# Patient Record
Sex: MALE | Race: WHITE | NOT HISPANIC OR LATINO | Employment: OTHER | URBAN - METROPOLITAN AREA
[De-identification: names, ages, dates, MRNs, and addresses within clinical notes are randomized per-mention and may not be internally consistent; named-entity substitution may affect disease eponyms.]

---

## 2017-01-04 ENCOUNTER — HISTORICAL (OUTPATIENT)
Dept: LAB | Facility: HOSPITAL | Age: 67
End: 2017-01-04

## 2017-01-11 ENCOUNTER — HISTORICAL (OUTPATIENT)
Dept: CARDIOLOGY | Facility: HOSPITAL | Age: 67
End: 2017-01-11

## 2021-05-03 ENCOUNTER — HISTORICAL (OUTPATIENT)
Dept: LAB | Facility: HOSPITAL | Age: 71
End: 2021-05-03

## 2021-05-03 LAB
ABS NEUT (OLG): 5.6 X10(3)/MCL (ref 1.5–6.9)
BASOPHILS # BLD AUTO: 0 X10(3)/MCL (ref 0–0.1)
BASOPHILS NFR BLD AUTO: 0 % (ref 0–1)
EOSINOPHIL # BLD AUTO: 0.2 X10(3)/MCL (ref 0–0.6)
EOSINOPHIL NFR BLD AUTO: 3 % (ref 0–5)
ERYTHROCYTE [DISTWIDTH] IN BLOOD BY AUTOMATED COUNT: 15.8 % (ref 11.5–17)
FERRITIN SERPL-MCNC: 296.48 NG/ML (ref 21.81–274.66)
FOLATE SERPL-MCNC: 9.9 NG/ML (ref 7–31.4)
HCT VFR BLD AUTO: 32.6 % (ref 42–52)
HGB BLD-MCNC: 10.8 GM/DL (ref 14–18)
IMM GRANULOCYTES # BLD AUTO: 0.03 10*3/UL (ref 0–0.02)
IMM GRANULOCYTES NFR BLD AUTO: 0.4 % (ref 0–0.43)
IRON SATN MFR SERPL: 22 % (ref 20–50)
IRON SERPL-MCNC: 59 UG/DL (ref 65–175)
LYMPHOCYTES # BLD AUTO: 1.5 X10(3)/MCL (ref 0.5–4.1)
LYMPHOCYTES NFR BLD AUTO: 18 % (ref 15–40)
MCH RBC QN AUTO: 31 PG (ref 27–34)
MCHC RBC AUTO-ENTMCNC: 33 GM/DL (ref 31–36)
MCV RBC AUTO: 95 FL (ref 80–99)
MONOCYTES # BLD AUTO: 0.7 X10(3)/MCL (ref 0–1.1)
MONOCYTES NFR BLD AUTO: 9 % (ref 4–12)
NEUTROPHILS # BLD AUTO: 5.6 X10(3)/MCL (ref 1.5–6.9)
NEUTROPHILS NFR BLD AUTO: 69 % (ref 43–75)
PLATELET # BLD AUTO: 176 X10(3)/MCL (ref 140–400)
PMV BLD AUTO: 11.2 FL (ref 6.8–10)
RBC # BLD AUTO: 3.44 X10(6)/MCL (ref 4.7–6.1)
RET# (OHS): 0.06 X10(6)/MCL (ref 0.03–0.1)
RETICULOCYTE COUNT AUTOMATED (OLG): 1.81 % (ref 0.5–1.5)
TIBC SERPL-MCNC: 212 UG/DL (ref 69–240)
TIBC SERPL-MCNC: 271 UG/DL (ref 250–450)
VIT B12 SERPL-MCNC: 454 PG/ML (ref 213–816)
WBC # SPEC AUTO: 8 X10(3)/MCL (ref 4.5–11.5)

## 2021-06-21 ENCOUNTER — HISTORICAL (OUTPATIENT)
Dept: LAB | Facility: HOSPITAL | Age: 71
End: 2021-06-21

## 2021-06-21 LAB
BUN SERPL-MCNC: 22 MG/DL (ref 8.4–25.7)
CALCIUM SERPL-MCNC: 10.2 MG/DL (ref 8.8–10)
CHLORIDE SERPL-SCNC: 103 MMOL/L (ref 98–107)
CO2 SERPL-SCNC: 27 MMOL/L (ref 23–31)
CREAT SERPL-MCNC: 1.38 MG/DL (ref 0.73–1.18)
CREAT/UREA NIT SERPL: 16
GLUCOSE SERPL-MCNC: 231 MG/DL (ref 82–115)
MAGNESIUM SERPL-MCNC: 1.4 MG/DL (ref 1.6–2.6)
POTASSIUM SERPL-SCNC: 4 MMOL/L (ref 3.5–5.1)
SODIUM SERPL-SCNC: 137 MMOL/L (ref 136–145)

## 2022-02-11 ENCOUNTER — HISTORICAL (OUTPATIENT)
Dept: LAB | Facility: HOSPITAL | Age: 72
End: 2022-02-11

## 2022-02-11 LAB
ABS NEUT (OLG): 7 (ref 1.5–6.9)
BUN SERPL-MCNC: 16 MG/DL (ref 8.4–25.7)
CALCIUM SERPL-MCNC: 9.6 MG/DL (ref 8.7–10.5)
CHLORIDE SERPL-SCNC: 107 MMOL/L (ref 98–107)
CO2 SERPL-SCNC: 24 MMOL/L (ref 23–31)
CREAT SERPL-MCNC: 1.28 MG/DL (ref 0.73–1.18)
CREAT/UREA NIT SERPL: 12
ERYTHROCYTE [DISTWIDTH] IN BLOOD BY AUTOMATED COUNT: 14.6 % (ref 11.5–17)
GLUCOSE SERPL-MCNC: 113 MG/DL (ref 82–115)
HCT VFR BLD AUTO: 34.6 % (ref 42–52)
HEMOLYSIS INTERF INDEX SERPL-ACNC: 1
HGB BLD-MCNC: 11.1 G/DL (ref 14–18)
ICTERIC INTERF INDEX SERPL-ACNC: 1
INR PPP: 1.1 (ref 2–3)
LIPEMIC INTERF INDEX SERPL-ACNC: <0
MCH RBC QN AUTO: 30 PG (ref 27–34)
MCHC RBC AUTO-ENTMCNC: 32 G/DL (ref 31–36)
MCV RBC AUTO: 92 FL (ref 80–99)
PLATELET # BLD AUTO: 284 10*3/UL (ref 140–400)
PMV BLD AUTO: 10.8 FL (ref 6.8–10)
POTASSIUM SERPL-SCNC: 5.2 MMOL/L (ref 3.5–5.1)
PROTHROMBIN TIME: 14.2 S (ref 11.7–14.5)
RBC # BLD AUTO: 3.75 10*6/UL (ref 4.7–6.1)
SODIUM SERPL-SCNC: 140 MMOL/L (ref 136–145)
WBC # SPEC AUTO: 9.4 10*3/UL (ref 4.5–11.5)

## 2022-02-24 ENCOUNTER — HISTORICAL (OUTPATIENT)
Dept: CARDIOLOGY | Facility: HOSPITAL | Age: 72
End: 2022-02-24

## 2022-02-24 LAB — CBG: 147 (ref 70–115)

## 2023-06-11 ENCOUNTER — HOSPITAL ENCOUNTER (EMERGENCY)
Facility: HOSPITAL | Age: 73
Discharge: HOME | End: 2023-06-11
Attending: INTERNAL MEDICINE
Payer: MEDICARE

## 2023-06-11 VITALS
HEART RATE: 54 BPM | RESPIRATION RATE: 18 BRPM | HEIGHT: 69 IN | DIASTOLIC BLOOD PRESSURE: 78 MMHG | SYSTOLIC BLOOD PRESSURE: 152 MMHG | BODY MASS INDEX: 31.1 KG/M2 | WEIGHT: 210 LBS | OXYGEN SATURATION: 84 % | TEMPERATURE: 98 F

## 2023-06-11 DIAGNOSIS — I87.2 VENOUS STASIS DERMATITIS OF BOTH LOWER EXTREMITIES: Primary | ICD-10-CM

## 2023-06-11 DIAGNOSIS — L03.119 CELLULITIS OF LOWER EXTREMITY, UNSPECIFIED LATERALITY: ICD-10-CM

## 2023-06-11 PROBLEM — I48.91 ATRIAL FIBRILLATION: Status: ACTIVE | Noted: 2023-06-11

## 2023-06-11 PROBLEM — E11.9 DIABETES MELLITUS: Status: ACTIVE | Noted: 2023-06-11

## 2023-06-11 PROBLEM — I10 HYPERTENSION: Status: ACTIVE | Noted: 2023-06-11

## 2023-06-11 PROCEDURE — 99283 EMERGENCY DEPT VISIT LOW MDM: CPT

## 2023-06-11 RX ORDER — FUROSEMIDE 40 MG/1
40 TABLET ORAL 2 TIMES DAILY
COMMUNITY
Start: 2023-05-25

## 2023-06-11 RX ORDER — VALSARTAN 40 MG/1
40 TABLET ORAL
COMMUNITY
Start: 2023-05-25

## 2023-06-11 RX ORDER — ATORVASTATIN CALCIUM 80 MG/1
80 TABLET, FILM COATED ORAL
COMMUNITY
Start: 2023-04-24

## 2023-06-11 RX ORDER — FELODIPINE 5 MG/1
5 TABLET, EXTENDED RELEASE ORAL
COMMUNITY
Start: 2023-05-25

## 2023-06-11 RX ORDER — ALLOPURINOL 300 MG/1
300 TABLET ORAL
COMMUNITY
Start: 2023-05-25

## 2023-06-11 RX ORDER — SERTRALINE HYDROCHLORIDE 100 MG/1
100 TABLET, FILM COATED ORAL
COMMUNITY
Start: 2023-05-25

## 2023-06-11 RX ORDER — GLIMEPIRIDE 4 MG/1
4 TABLET ORAL
COMMUNITY
Start: 2023-04-24 | End: 2024-01-04

## 2023-06-11 RX ORDER — CARVEDILOL 25 MG/1
25 TABLET ORAL 2 TIMES DAILY
COMMUNITY
Start: 2023-04-24

## 2023-06-11 RX ORDER — AMOXICILLIN AND CLAVULANATE POTASSIUM 875; 125 MG/1; MG/1
1 TABLET, FILM COATED ORAL 2 TIMES DAILY
Qty: 20 TABLET | Refills: 0 | Status: SHIPPED | OUTPATIENT
Start: 2023-06-11 | End: 2023-06-21

## 2023-06-11 NOTE — DISCHARGE INSTRUCTIONS
See wound Care for Evaluation and further management of your wound as needed.  Call them to get an appointment.    [facility]  [address] [place]  [address]            Take medicines as prescribed    See your family doctor in one to 2 days for further evaluation, workup, and treatment as necessary    Avoid driving or operating machinery while taking medicines as some medicines might cause drowsiness and may cause problems. Also pain medicines have potential of being addictive  so use Pain meds specially Narcotics Sparingly.    The exam and treatment you received in Emergency Room was for an urgent problem and NOT INTENDED AS COMPLETE CARE. It is important that you FOLLOW UP with a doctor for ongoing care. If your symptoms become WORSE or you DO NOT IMPROVE and you are unable to reach your health care provider, you should RETURN to the emergency department. The Emergency Room doctor has provided a PRELIMINARY INTERPRETATION of all your STUDIES. A final interpretation may be done after you are discharged. IF A CHANGE in your diagnosis or treatment is needed WE WILL CONTACT YOU. It is critical that we have a CURRENT PHONE NUMBER FOR YOU.

## 2023-06-11 NOTE — ED PROVIDER NOTES
Encounter Date: 6/11/2023  History from patient     History     Chief Complaint   Patient presents with    Leg Swelling     Pt states bilateral legs red and has bumps, pt states he always has leg swelling.      HPI    72 y.o. male  has a past medical history of CHF (congestive heart failure), Chronic venous stasis dermatitis of both lower extremities, Depression, Diabetes mellitus, Gout, unspecified, Hypertension, and Paroxysmal atrial fibrillation. Presenting with  Leg Swelling (Pt states bilateral legs red and has bumps, pt states he always has leg swelling. )        Review of patient's allergies indicates:  No Known Allergies  Past Medical History:   Diagnosis Date    CHF (congestive heart failure)     Chronic venous stasis dermatitis of both lower extremities     Depression     Diabetes mellitus     Gout, unspecified     Hypertension     Paroxysmal atrial fibrillation      Past Surgical History:   Procedure Laterality Date    CORONARY ARTERY BYPASS GRAFT       Family History   Problem Relation Age of Onset    Diabetes Mother     Heart disease Father      Social History     Tobacco Use    Smoking status: Never    Smokeless tobacco: Never   Substance Use Topics    Alcohol use: Never    Drug use: Never     Review of Systems   HENT:  Negative for trouble swallowing and voice change.    Eyes:  Negative for visual disturbance.   Respiratory:  Positive for cough. Negative for chest tightness and shortness of breath.    Cardiovascular:  Positive for leg swelling. Negative for chest pain and palpitations.   Gastrointestinal:  Negative for abdominal pain, diarrhea and vomiting.   Genitourinary:  Negative for dysuria and hematuria.   Musculoskeletal:  Negative for gait problem.        Bilateral lower extremity chronic swelling and in the last couple of weeks has developed redness in there.   Skin:  Negative for color change and rash.   Neurological:  Negative for headaches.   Psychiatric/Behavioral:  Negative for  behavioral problems and sleep disturbance.    All other systems reviewed and are negative.    Physical Exam     Initial Vitals [06/11/23 0901]   BP Pulse Resp Temp SpO2   (!) 151/73 (!) 55 18 98.2 °F (36.8 °C) (!) 91 %      MAP       --         Physical Exam    Nursing note and vitals reviewed.  Constitutional: He appears well-developed and well-nourished. No distress.   HENT:   Head: Atraumatic.   Eyes: EOM are normal.   Neck: Neck supple.   Cardiovascular:  Normal rate and normal heart sounds.           Pulmonary/Chest: Breath sounds normal. No respiratory distress. He has no wheezes. He has no rales.   Abdominal: Abdomen is soft. Bowel sounds are normal. There is no abdominal tenderness.   Musculoskeletal:         General: Edema present. No tenderness. Normal range of motion.      Cervical back: Neck supple. No bony tenderness.     Neurological: He is alert.   Speech Normal   Skin: Skin is dry.   Bilateral lower extremity venous stasis dermatitis with some erythema of bilateral lower extremities, it blanches, no particular tenderness, edema is nonpitting   Psychiatric: He has a normal mood and affect.   Pleasant       ED Course   Procedures    Orders Placed This Encounter   Procedures    Ambulatory referral/consult to Wound Clinic         Labs Reviewed - No data to display       Imaging Results    None          Medications - No data to display  Medical Decision Making:   Initial Assessment:   72 y.o. male  has a past medical history of CHF (congestive heart failure), Chronic venous stasis dermatitis of both lower extremities, Depression, Diabetes mellitus, Gout, unspecified, Hypertension, and Paroxysmal atrial fibrillation. Presenting with  Leg Swelling (Pt states bilateral legs red and has bumps, pt states he always has leg swelling. )    Patient essentially has chronic venous stasis dermatitis of bilateral lower extremities, he said a couple of years back he was admitted in the hospital and he was oozing serum  from there and they had to put dressings on both of them which took care of it,    Denies any fever, denies any other complaints whatsoever denies any shortness of breath, he has some cough which she relates to sinus problem.    Patient is taking Xarelto for AFib on a regular basis he gets the samples from his doctor             ED Course as of 06/11/23 0947   Sun Jun 11, 2023   0946 Have sent a consult for wound care for him and I am putting him on Augmentin for cellulitis of bilateral lower extremities which is more superficial erythema at this time and no deep extension at this time. [GQ]      ED Course User Index  [GQ] Gabbi Joy MD                 Clinical Impression:   Final diagnoses:  [I87.2] Venous stasis dermatitis of both lower extremities (Primary)  [L03.119] Cellulitis of lower extremity, unspecified laterality - Bilateral excluding feet        ED Disposition Condition    Discharge Stable          ED Prescriptions       Medication Sig Dispense Start Date End Date Auth. Provider    amoxicillin-clavulanate 875-125mg (AUGMENTIN) 875-125 mg per tablet Take 1 tablet by mouth 2 (two) times daily. for 10 days 20 tablet 6/11/2023 6/21/2023 Gabbi Joy MD          Follow-up Information       Follow up With Specialties Details Why Contact Info    PMD  In 2 days               Gabbi Joy MD  06/11/23 0922

## 2023-06-16 ENCOUNTER — HOSPITAL ENCOUNTER (OUTPATIENT)
Dept: WOUND CARE | Facility: HOSPITAL | Age: 73
Discharge: HOME | End: 2023-06-16
Attending: FAMILY MEDICINE
Payer: MEDICARE

## 2023-06-16 VITALS
WEIGHT: 210 LBS | SYSTOLIC BLOOD PRESSURE: 145 MMHG | BODY MASS INDEX: 31.1 KG/M2 | RESPIRATION RATE: 18 BRPM | DIASTOLIC BLOOD PRESSURE: 68 MMHG | HEIGHT: 69 IN | HEART RATE: 55 BPM

## 2023-06-16 DIAGNOSIS — I87.2 CHRONIC VENOUS INSUFFICIENCY: Primary | ICD-10-CM

## 2023-06-16 DIAGNOSIS — I87.2 STASIS DERMATITIS OF BOTH LEGS: ICD-10-CM

## 2023-06-16 PROCEDURE — 99203 OFFICE O/P NEW LOW 30 MIN: CPT

## 2023-06-16 PROCEDURE — 27000999 HC MEDICAL RECORD PHOTO DOCUMENTATION

## 2023-06-16 RX ORDER — BETAMETHASONE VALERATE 1.2 MG/G
CREAM TOPICAL DAILY
Qty: 45 G | Refills: 1 | Status: SHIPPED | OUTPATIENT
Start: 2023-06-16 | End: 2023-06-16 | Stop reason: SDUPTHER

## 2023-06-16 RX ORDER — BETAMETHASONE VALERATE 1.2 MG/G
CREAM TOPICAL DAILY
Qty: 45 G | Refills: 1 | Status: SHIPPED | OUTPATIENT
Start: 2023-06-16 | End: 2023-08-07

## 2023-06-16 NOTE — PROGRESS NOTES
"   Subjective:       Patient ID: Chepe Peralta Sr. is a 72 y.o. male.    Chief Complaint: No chief complaint on file.    72-year-old white male, who was referred from the emergency room several days ago, for stasis dermatitis and possible early cellulitis both lower legs.  He also has some edema around his ankles lower legs and feet.  He had this a few years ago also.  He has never been diagnosed with venous insufficiency as for a as he knows.  He also has not been diagnosed with arterial peripheral disease.  He does have a history of CHF, with atrial fibrillation.  He is scheduled to see his cardiologist soon.  He tells me that the erythema has improved since he was in the emergency room last week.  He also has some small blisters posteriorly on both calves.  There was no venous reflux noted in either leg on a venous ultrasound in October 2020.  Presented to [facility] ED on 06/11/2023 with c/o leg swelling and redness with blisters appearing. Patient was given PO Augmentin and told to make an appointment with wound care. Is a patient of Dr. Sevilla with [facility] but denies ever having any ultrasounds of his legs and has never has any stents placed in legs. Has had a CABG done in the past; patient in unsure of exact date but states it has been around 10-12 years and is currently taking Xarelto. DMII with a CBG of 131 today. Has full sensation of his feet. [place] Abraham is 10/10.     Review of Systems   Constitutional: Negative.    HENT: Negative.     Respiratory: Negative.     Cardiovascular: Negative.    Skin:         As documented in the HPI.   All other systems reviewed and are negative.      Objective:      Vitals:    06/16/23 1021   BP: (!) 145/68   BP Location: Right arm   Patient Position: Sitting   Pulse: (!) 55   Resp: 18   Weight: 95.3 kg (210 lb)   Height: 5' 9" (1.753 m)        Physical Exam  Vitals and nursing note reviewed.   Constitutional:       Appearance: Normal appearance.   Cardiovascular:      Rate " and Rhythm: Normal rate.   Pulmonary:      Effort: Pulmonary effort is normal.   Skin:     General: Skin is warm and dry.      Comments: Both of his legs reveal edema around the ankles and feet, pitting edema.  There is some chronic erythema on both anterior legs.  There are some blisters behind the calves.  There are no open wounds at this time.  The pulses are not palpable, but I was able to auscultated all 4 pedal pulses, with the Doppler.  It sounds triphasic.   Neurological:      Mental Status: He is alert.     Left lower posterior    Left lower medial leg    Left lower anterior leg    Right lower medial leg    Right lower anterior leg       Altered Skin Integrity 06/16/23 1024 Right lower Leg #1 (Active)   06/16/23 1024   Altered Skin Integrity Present on Admission - Did Patient arrive to the hospital with altered skin?: yes   Side: Right   Orientation: lower   Location: Leg   Wound Number: #1   Is this injury device related?: No   Primary Wound Type:    Description of Altered Skin Integrity:    Ankle-Brachial Index:    Pulses:    Removal Indication and Assessment:    Wound Outcome:    (Retired) Wound Length (cm):    (Retired) Wound Width (cm):    (Retired) Depth (cm):    Wound Description (Comments):    Removal Indications:    Dressing Appearance Moist drainage;Open to air 06/16/23 1023   Drainage Amount None 06/16/23 1023   Appearance Red 06/16/23 1023   Tissue loss description Full thickness 06/16/23 1023   Periwound Area Edematous;Redness;Blistered 06/16/23 1023   Wound Edges Open 06/16/23 1023   Wound Length (cm) 28 cm 06/16/23 1023   Wound Width (cm) 42.5 cm 06/16/23 1023   Wound Depth (cm) 0.2 cm 06/16/23 1023   Wound Volume (cm^3) 238 cm^3 06/16/23 1023   Wound Surface Area (cm^2) 1190 cm^2 06/16/23 1023   Care Cleansed with:;Wound cleanser 06/16/23 1023   Dressing Applied;Other (comment) 06/16/23 1023   Dressing Change Due 06/17/23 06/16/23 1023            Altered Skin Integrity 06/16/23 1024 Left  lower Leg #2 (Active)   06/16/23 1024   Altered Skin Integrity Present on Admission - Did Patient arrive to the hospital with altered skin?: yes   Side: Left   Orientation: lower   Location: Leg   Wound Number: #2   Is this injury device related?: No   Primary Wound Type:    Description of Altered Skin Integrity:    Ankle-Brachial Index:    Pulses:    Removal Indication and Assessment:    Wound Outcome:    (Retired) Wound Length (cm):    (Retired) Wound Width (cm):    (Retired) Depth (cm):    Wound Description (Comments):    Removal Indications:    Dressing Appearance Open to air 06/16/23 1023   Drainage Amount None 06/16/23 1023   Appearance Red 06/16/23 1023   Tissue loss description Full thickness 06/16/23 1023   Periwound Area Edematous;Redness;Blistered 06/16/23 1023   Wound Edges Open 06/16/23 1023   Wound Length (cm) 34 cm 06/16/23 1023   Wound Width (cm) 43 cm 06/16/23 1023   Wound Depth (cm) 0.2 cm 06/16/23 1023   Wound Volume (cm^3) 292.4 cm^3 06/16/23 1023   Wound Surface Area (cm^2) 1462 cm^2 06/16/23 1023   Care Cleansed with:;Wound cleanser 06/16/23 1023   Dressing Applied;Other (comment) 06/16/23 1023   Dressing Change Due 06/17/23 06/16/23 1023         Assessment:         ICD-10-CM ICD-9-CM   1. Chronic venous insufficiency  I87.2 459.81   2. Stasis dermatitis of both legs  I87.2 454.1         Procedures:   Excisional debridement performed:  [] Yes [x] No   Selective debridement performed:  [] Yes [x] No   Mechanical debridement performed:  [x] Yes [] No   Silver nitrate applied :    [] Yes [x] No   Labs ordered this visit:   [] Yes [x] No   Imaging ordered this visit:   [] Yes [x] No   Tissue pathology and/or culture taken:  [] Yes [x] No     Procedures:  HOME HEALTH AGENCY: none  TIMES PER WEEK/DAYS:  ORDERS:  Right lower leg wound: Cleanse with normal saline, apply steroid cream, and size F tubigrip to be done daily   Left lower leg wound: Cleanse with normal saline, apply steroid cream, and size  F tubigrip to be done daily     *Order ultrasounds of bilateral legs   Betamethasone 0.1% cream daily    Patient Orders:  He may need Unna boots in the future, if arterial circulation adequate.               Follow up in 1 week (on 6/23/2023) for praveen. lower legs .

## 2023-06-26 ENCOUNTER — HOSPITAL ENCOUNTER (OUTPATIENT)
Dept: RADIOLOGY | Facility: HOSPITAL | Age: 73
Discharge: HOME | End: 2023-06-26
Attending: FAMILY MEDICINE
Payer: MEDICARE

## 2023-06-26 DIAGNOSIS — R60.0 LOCALIZED EDEMA: ICD-10-CM

## 2023-06-26 PROCEDURE — 93925 LOWER EXTREMITY STUDY: CPT | Mod: TC

## 2023-06-26 PROCEDURE — 93970 EXTREMITY STUDY: CPT | Mod: TC

## 2023-07-19 ENCOUNTER — HOSPITAL ENCOUNTER (EMERGENCY)
Facility: HOSPITAL | Age: 73
Discharge: HOME | End: 2023-07-19
Attending: EMERGENCY MEDICINE
Payer: MEDICARE

## 2023-07-19 VITALS
SYSTOLIC BLOOD PRESSURE: 136 MMHG | RESPIRATION RATE: 18 BRPM | DIASTOLIC BLOOD PRESSURE: 58 MMHG | OXYGEN SATURATION: 95 % | BODY MASS INDEX: 27.2 KG/M2 | WEIGHT: 190 LBS | HEART RATE: 56 BPM | TEMPERATURE: 99 F | HEIGHT: 70 IN

## 2023-07-19 DIAGNOSIS — L03.119 CELLULITIS OF LOWER EXTREMITY, UNSPECIFIED LATERALITY: ICD-10-CM

## 2023-07-19 DIAGNOSIS — I87.2 CHRONIC VENOUS STASIS DERMATITIS OF BOTH LOWER EXTREMITIES: Primary | ICD-10-CM

## 2023-07-19 PROCEDURE — 99283 EMERGENCY DEPT VISIT LOW MDM: CPT

## 2023-07-19 RX ORDER — AMOXICILLIN AND CLAVULANATE POTASSIUM 875; 125 MG/1; MG/1
1 TABLET, FILM COATED ORAL 2 TIMES DAILY
Qty: 14 TABLET | Refills: 0 | Status: SHIPPED | OUTPATIENT
Start: 2023-07-19 | End: 2023-07-26

## 2023-07-19 NOTE — ED PROVIDER NOTES
Encounter Date: 2023       History     Chief Complaint   Patient presents with    Leg Swelling     Bilat leg celulitis for 3 weeks     Mr. Peralta comes emergency department complaining of redness to both his legs this has been there for several months.  This is not new or different.  He has been sent to Wound Care for this chronic venous stasis dermatitis is primary diagnosis in addition to his diabetes hypertension depression gout.  He is anticoagulated for his atrial fibrillation.      Quit smoking over 25 years ago still drinks alcohol occasionally no street drugs    Patient has had no COVID vaccines.  Pneumonia yes flu yes tetanus she has.      Triple bypass surgery years ago.      Primary healthcare provider Payton Lawson [phone] [facility]    Is  retired   mom  of diabetes problems dad  of heart troubles.    Has no known drug allergies.  Denies fever chills denies nausea denies vomiting    Review of patient's allergies indicates:  No Known Allergies  Past Medical History:   Diagnosis Date    CHF (congestive heart failure)     Chronic venous stasis dermatitis of both lower extremities     Depression     Diabetes mellitus     Gout, unspecified     Hypertension     Paroxysmal atrial fibrillation      Past Surgical History:   Procedure Laterality Date    CORONARY ARTERY BYPASS GRAFT       Family History   Problem Relation Age of Onset    Diabetes Mother     Heart disease Father      Social History     Tobacco Use    Smoking status: Never    Smokeless tobacco: Current   Substance Use Topics    Alcohol use: Never    Drug use: Never     Review of Systems   Constitutional: Negative.    HENT: Negative.     Eyes: Negative.    Respiratory: Negative.     Cardiovascular:  Positive for leg swelling.   Gastrointestinal: Negative.    Endocrine: Negative.    Skin:  Positive for rash and wound.        Bilateral lower extremities very symmetrical erythematous changes circumferentially.   From the proximal 1/4 of the tib fib down to the ankle.  Venous stasis dermatitis.  Minimum weeping no obvious open gross sores no odor no drainage   Allergic/Immunologic: Negative.    Neurological: Negative.    Hematological: Negative.    Psychiatric/Behavioral: Negative.     All other systems reviewed and are negative.    Physical Exam     Initial Vitals [07/19/23 1437]   BP Pulse Resp Temp SpO2   136/68 (!) 58 18 99 °F (37.2 °C) 95 %      MAP       --         Physical Exam    Nursing note and vitals reviewed.  Constitutional: He appears well-developed and well-nourished.   White male awake oriented pleasant fellow   HENT:   Head: Normocephalic and atraumatic.   Right Ear: Tympanic membrane and external ear normal.   Left Ear: Tympanic membrane and external ear normal.   Nose: Nose normal.   Mouth/Throat: Oropharynx is clear and moist and mucous membranes are normal. Oral lesions: moist muc memb.   Eyes: Conjunctivae and EOM are normal. Pupils are equal, round, and reactive to light.   Neck: Neck supple. No thyromegaly present. No tracheal deviation present. No JVD present.   Normal range of motion.  Cardiovascular:  Normal rate, regular rhythm, normal heart sounds and intact distal pulses.     Exam reveals no gallop and no friction rub.       No murmur heard.  Pulmonary/Chest: No stridor. He has wheezes. He has no rhonchi. He has rales. He exhibits no tenderness.   Abdominal: Abdomen is soft. Bowel sounds are normal. He exhibits no distension and no mass. No signs of injury. There is no abdominal tenderness.   Genitourinary:    Genitourinary Comments: No CVA tenderness     Musculoskeletal:         General: No tenderness or edema. Normal range of motion.      Cervical back: Normal range of motion and neck supple.     Lymphadenopathy:     He has no cervical adenopathy.   Neurological: He is alert and oriented to person, place, and time. He has normal strength. No cranial nerve deficit or sensory deficit. GCS score  is 15. GCS eye subscore is 4. GCS verbal subscore is 5. GCS motor subscore is 6.   Skin: Skin is warm and dry. Capillary refill takes 2 to 3 seconds. Rash noted.   Bilateral lower extremities fiery red circumferential swelling pitting edema of both lower extremities.  Extends from just below the tibial tuberosity down to the ankle.  Symmetrical on both sides there is minimal amount of blistering there is minimum amount of weeping there is no open sores or foul drainage there is no odor associated with the distal dorsalis pedis posterior tibial pulses present.  Patient has normal sensation.  Patient does have pitting edema in all this area of erythema.    Review of prior records show that on the 26th of June patient had normal arterial ultrasound and normal venous ultrasounds in regard to flow and no DVTs.  Bilateral lower extremity     Psychiatric: He has a normal mood and affect. His behavior is normal. Judgment and thought content normal.       ED Course   Procedures  Labs Reviewed - No data to display       Imaging Results    None          Medications - No data to display  Medical Decision Making:   Initial Assessment:   Chronic leg issues  Bilateral lower extremities fire red erythematous changes circumferentially consistent with chronic venous stasis dermatitis.  Minimum weeping no open sores no foul odor.  Dorsalis pedis posterior pulses are intact 2+ pitting  Edema lower extremities chronic COPD with minimum end-stage wheezing.  Good air movement awake oriented speaking clearly not toxic today.  Remainder of the exam is fairly benign.  Differential Diagnosis:   Chronic venous stasis disease bilateral lower extremities.  Possibly minimum secondary cellulitis.  Noncompliance with medical treatment plan  ED Management:  Treatments evaluation I am willing to try the short course of Augmentin that he says helped him in June 7 days written I stressed to the son and the patient they need to call their primary  healthcare provider make an appointment son was doing that when I left the room  MDM  Problems addressed  Co-morbidities and/or factors adding to the complexity or risk for the patient:  Noncompliance with appointments has not seen primary healthcare provider since last October  Problems addressed:  Exacerbation of chronic venous stasis dermatitis bilateral lower extremities  Acute problem/illness or progression/exacerbation of chronic problem with potential threat to life/bodily dysfunction?:  The cellulitis could become full blown  Differential diagnoses/problems considered: see above     Amount and/or Complexity of Data Reviewed  Independent Historian: none (see above for summary)  External Data Reviewed: notes from previous ED visits, prior labs, and prior imaging (see above for summary)  Risk and benefits of testing: discussed   Labs: Labs: ordered and reviewed  Radiology:Radiology: ordered and independent interpretation performed (see above or ED course)  ECG/medicine tests:Radiology: ordered and independent interpretation performed (see above or ED course)  discussed with primary care physician    Risk  Prescription drug management shared decision-making    Critical Care  none            ED Course as of 07/19/23 1537   Wed Jul 19, 2023   1511 DARLING LAWSON  [facility]     I spoke to Ms. Lawson  She said that she has not seen the patient since last October that the patient does not keep his appointments on a regular basis.  She recommended that they schedule an appointment come in to get seen she complete the paperwork for rehabilitation admission if indicated [DM]   1536 I did review the old records.  In June he was treated with a course of Augmentin which she said greatly improved his legs.  I am willing to put him back on a short course of this 7 days I did explain to Mr. Garcia in his son who was in the room that I talked to the primary healthcare provider is not been in since October he  must call and make an appointment and go get seen and re-evaluated.  In the office [DM]      ED Course User Index  [DM] Nael Yañez MD                   Clinical Impression:   Final diagnoses:  [I87.2] Chronic venous stasis dermatitis of both lower extremities (Primary)  [L03.119] Cellulitis of lower extremity, unspecified laterality - Bilateral chronic        ED Disposition Condition    Discharge Stable          ED Prescriptions       Medication Sig Dispense Start Date End Date Auth. Provider    amoxicillin-clavulanate 875-125mg (AUGMENTIN) 875-125 mg per tablet Take 1 tablet by mouth 2 (two) times daily. for 7 days 14 tablet 7/19/2023 7/26/2023 Nael Yañez MD          Follow-up Information       Follow up With Specialties Details Why Contact Info    HOME [facility] Call today make an appointment for tomorrow or Friday to get re-evaluated and be assessed for admission to rehabilitation unit [address]  [facility] [address]  [phone]               Nael Yañez MD  07/19/23 5912

## 2023-07-19 NOTE — DISCHARGE INSTRUCTIONS
Please take the 7 days of antibiotics to help make sure this is no secondary infection     elevate your legs as much as possible     continue previous medication otherwise      you must see your primary healthcare provider regarding admission to the rehabilitation facility

## 2023-07-24 ENCOUNTER — HOSPITAL ENCOUNTER (OUTPATIENT)
Dept: RADIOLOGY | Facility: HOSPITAL | Age: 73
Discharge: HOME | End: 2023-07-24
Attending: NURSE PRACTITIONER
Payer: MEDICARE

## 2023-07-24 DIAGNOSIS — M25.561 RIGHT KNEE PAIN: ICD-10-CM

## 2023-07-24 PROCEDURE — 73562 X-RAY EXAM OF KNEE 3: CPT | Mod: TC,RT

## 2023-08-07 ENCOUNTER — HOSPITAL ENCOUNTER (OUTPATIENT)
Dept: WOUND CARE | Facility: HOSPITAL | Age: 73
Discharge: HOME | End: 2023-08-07
Attending: FAMILY MEDICINE
Payer: MEDICARE

## 2023-08-07 VITALS
BODY MASS INDEX: 27.2 KG/M2 | SYSTOLIC BLOOD PRESSURE: 136 MMHG | RESPIRATION RATE: 18 BRPM | HEART RATE: 55 BPM | DIASTOLIC BLOOD PRESSURE: 62 MMHG | HEIGHT: 70 IN | WEIGHT: 190 LBS

## 2023-08-07 DIAGNOSIS — I87.2 VENOUS STASIS DERMATITIS OF BOTH LOWER EXTREMITIES: ICD-10-CM

## 2023-08-07 DIAGNOSIS — L03.119 CELLULITIS OF LOWER EXTREMITY, UNSPECIFIED LATERALITY: ICD-10-CM

## 2023-08-07 DIAGNOSIS — B07.9 VERRUCA VULGARIS OF LOWER EXTREMITY: Primary | ICD-10-CM

## 2023-08-07 PROCEDURE — 27000999 HC MEDICAL RECORD PHOTO DOCUMENTATION

## 2023-08-07 PROCEDURE — 99213 OFFICE O/P EST LOW 20 MIN: CPT

## 2023-08-07 PROCEDURE — 29580 STRAPPING UNNA BOOT: CPT | Mod: 50

## 2023-08-07 RX ORDER — TAMSULOSIN HYDROCHLORIDE 0.4 MG/1
1 CAPSULE ORAL
COMMUNITY
Start: 2023-07-14

## 2023-08-07 NOTE — PROGRESS NOTES
Home Health: NA  Smoker   [] Yes   [x] No   Diabetic  [x] Yes  [] No   Vascular Surgeon: NS (Dr. Sevilla -- Cardiologist)  Vascular procedures [] Yes [x] No    Recent Ultrasounds [x] Yes [] No   If so, when were they done? 06/26/2023  Compression Pumps  [] Yes [x] No       Right ankle - 27.5     Right calf -41.5     Left ankle -  28     Left calf -43  Doppler done in office? [] Yes [x] No        Right dorsalis:     Right posterior:     Left dorsalis:     Left posterior:  Is the patient eligible for a graft, and/or currently grafting? [] Yes [x] No    If so, which one/size?          Subjective:       Patient ID: Chepe Peralta Sr. is a 72 y.o. male.    Chief Complaint: Wound Care (Right lower leg /Left lower leg )    HPI    6/16/23 (Dr. Ann) - 72-year-old white male, who was referred from the emergency room several days ago, for stasis dermatitis and possible early cellulitis both lower legs.  He also has some edema around his ankles lower legs and feet.  He had this a few years ago also.  He has never been diagnosed with venous insufficiency as for a as he knows.  He also has not been diagnosed with arterial peripheral disease.  He does have a history of CHF, with atrial fibrillation.  He is scheduled to see his cardiologist soon.  He tells me that the erythema has improved since he was in the emergency room last week.  He also has some small blisters posteriorly on both calves.  There was no venous reflux noted in either leg on a venous ultrasound in October 2020.  Presented to [facility] ED on 06/11/2023 with c/o leg swelling and redness with blisters appearing. Patient was given PO Augmentin and told to make an appointment with wound care. Is a patient of Dr. Sevilla with [facility] but denies ever having any ultrasounds of his legs and has never has any stents placed in legs. Has had a CABG done in the past; patient in unsure of exact date but states it has been around 10-12 years and is currently taking Xarelto. DMII with a  "CBG of 131 today. Has full sensation of his feet. [place] is 10/10.     8/7/23 - Mr. Peralta returns to clinic today for follow up on his bilateral lower extremities.  He was seen at wound care on 06/16/2023 by Dr. Ann and was given steroid cream and had ultrasounds ordered. Ultrasounds completed on 06/26/2023. Patient has been using hibiclens at home and the bilateral legs have improved.  There is still hemosiderin staining present, as well as lipodermatosclerosis.  Today the majority of the blistered areas are resolved.  We did review his ultrasound results and because there are no significant arterial concerns we will move forward with application of unna boots. There is very little drainage noted, and because of that we will leave the unna boots in place for one week without change.       Review of Systems   Constitutional: Negative.    HENT: Negative.     Respiratory: Negative.     Cardiovascular: Negative.    Skin:         As documented in the HPI.   All other systems reviewed and are negative.        Objective:      Vitals:    08/07/23 1106   BP: 136/62   BP Location: Right arm   Patient Position: Sitting   Pulse: (!) 55   Resp: 18   Weight: 86.2 kg (190 lb)   Height: 5' 10" (1.778 m)        Physical Exam  Vitals and nursing note reviewed.   Constitutional:       Appearance: Normal appearance.   Cardiovascular:      Rate and Rhythm: Normal rate.   Pulmonary:      Effort: Pulmonary effort is normal.   Skin:     General: Skin is warm and dry.      Comments: Blisters to bilateral calves.   Neurological:      Mental Status: He is alert.            Altered Skin Integrity 06/16/23 1024 Right lower Leg #1 (Active)   06/16/23 1024   Altered Skin Integrity Present on Admission - Did Patient arrive to the hospital with altered skin?: yes   Side: Right   Orientation: lower   Location: Leg   Wound Number: #1   Is this injury device related?: No   Primary Wound Type:    Description of Altered Skin Integrity:  "   Ankle-Brachial Index:    Pulses:    Removal Indication and Assessment:    Wound Outcome:    (Retired) Wound Length (cm):    (Retired) Wound Width (cm):    (Retired) Depth (cm):    Wound Description (Comments):    Removal Indications:    Dressing Appearance Moist drainage 08/07/23 1107   Drainage Amount Moderate 08/07/23 1107   Drainage Characteristics/Odor Serosanguineous 08/07/23 1107   Appearance Moist 08/07/23 1107   Tissue loss description Full thickness 08/07/23 1107   Periwound Area Edematous;Redness 08/07/23 1107   Wound Edges Open 08/07/23 1107   Wound Length (cm) 26 cm 08/07/23 1107   Wound Width (cm) 41.5 cm 08/07/23 1107   Wound Depth (cm) 0.1 cm 08/07/23 1107   Wound Volume (cm^3) 107.9 cm^3 08/07/23 1107   Wound Surface Area (cm^2) 1079 cm^2 08/07/23 1107   Care Cleansed with:;Wound cleanser 08/07/23 1107   Dressing Applied;Other (comment) 08/07/23 1107   Dressing Change Due 08/14/23 08/07/23 1107            Altered Skin Integrity 06/16/23 1024 Left lower Leg #2 (Active)   06/16/23 1024   Altered Skin Integrity Present on Admission - Did Patient arrive to the hospital with altered skin?: yes   Side: Left   Orientation: lower   Location: Leg   Wound Number: #2   Is this injury device related?: No   Primary Wound Type:    Description of Altered Skin Integrity:    Ankle-Brachial Index:    Pulses:    Removal Indication and Assessment:    Wound Outcome:    (Retired) Wound Length (cm):    (Retired) Wound Width (cm):    (Retired) Depth (cm):    Wound Description (Comments):    Removal Indications:    Dressing Appearance Moist drainage 08/07/23 1107   Drainage Amount Moderate 08/07/23 1107   Drainage Characteristics/Odor Serosanguineous 08/07/23 1107   Appearance Moist 08/07/23 1107   Tissue loss description Full thickness 08/07/23 1107   Periwound Area Edematous;Redness 08/07/23 1107   Wound Edges Open 08/07/23 1107   Wound Length (cm) 25 cm 08/07/23 1107   Wound Width (cm) 43 cm 08/07/23 1107   Wound Depth  (cm) 0.2 cm 08/07/23 1107   Wound Volume (cm^3) 215 cm^3 08/07/23 1107   Wound Surface Area (cm^2) 1075 cm^2 08/07/23 1107   Care Wound cleanser;Cleansed with: 08/07/23 1107   Dressing Applied;Other (comment) 08/07/23 1107   Dressing Change Due 08/14/23 08/07/23 1107     8/7/23    Left lower leg   Calamine unna boot, kerlix, coban   CT      Right lower leg   Calamine unna boot, kerlix, coban  CT      Assessment:         ICD-10-CM ICD-9-CM   1. Venous stasis dermatitis of both lower extremities  I87.2 454.1   2. Cellulitis of lower extremity, unspecified laterality  L03.119 682.6         Procedures:     Mechanical debridement only     Excisional debridement performed:  [] Yes [] No   Selective debridement performed:  [] Yes [] No   Mechanical debridement performed:  [x] Yes [] No   Silver nitrate applied :    [] Yes [] No   Labs ordered this visit:   [] Yes [] No   Imaging ordered this visit:   [] Yes [] No   Tissue pathology and/or culture taken:  [] Yes [] No     Imaging:  Arterial:  FINDINGS:  Ultrasound Doppler and color flow imaging were performed on the arteries of the lower extremities bilaterally. There is scattered plaque formation at the arteries of lower extremities bilaterally with less than 30% stenosis at the common femoral arteries bilaterally.  A tri phasic flow wave pattern is evident throughout the visualized arteries of the lower extremities bilaterally.   Impression:   1. Scattered atherosclerotic plaque formation throughout the arteries of the lower extremities bilaterally with less than 30% stenosis at the common femoral arteries bilaterally.    Venous:  FINDINGS:  There is normal compression and flow noted in the  common femoral vein, superficial femoral vein, popliteal vein, and  posterior tibial veinbilaterally.  No evidence of deep venous thrombosis is identified.  The left greater saphenous vein is surgically absent.  No venous reflux is identified.  Impression:   1.  No deep venous  thrombosis identified to the lower extremities bilaterally   2.  Surgically absent left greater saphenous vein          Procedures:  HOME HEALTH AGENCY: none  TIMES PER WEEK/DAYS:  ORDERS:  Right lower leg wounds: Leave current dressing, calamine unna boot, in place for one week. Do not remove until next wound care visit on 08/14/2023  Left lower leg wounds: Leave current dressing, calamine unna boot, in place for one week. Do not remove until next wound care visit on 08/14/2023               Follow up in 1 week (on 8/14/2023) for bilateral lower legs.        Electronically signed,    Marta Heard

## 2023-08-14 ENCOUNTER — HOSPITAL ENCOUNTER (OUTPATIENT)
Dept: WOUND CARE | Facility: HOSPITAL | Age: 73
Discharge: HOME | End: 2023-08-14
Attending: NURSE PRACTITIONER
Payer: MEDICARE

## 2023-08-14 VITALS
DIASTOLIC BLOOD PRESSURE: 63 MMHG | RESPIRATION RATE: 17 BRPM | BODY MASS INDEX: 27.2 KG/M2 | HEIGHT: 70 IN | SYSTOLIC BLOOD PRESSURE: 139 MMHG | WEIGHT: 190 LBS | HEART RATE: 57 BPM

## 2023-08-14 DIAGNOSIS — B07.9 VERRUCA VULGARIS OF LOWER EXTREMITY: ICD-10-CM

## 2023-08-14 DIAGNOSIS — I87.2 CHRONIC VENOUS INSUFFICIENCY: ICD-10-CM

## 2023-08-14 DIAGNOSIS — I87.2 VENOUS STASIS DERMATITIS OF BOTH LOWER EXTREMITIES: Primary | ICD-10-CM

## 2023-08-14 DIAGNOSIS — I87.2 STASIS DERMATITIS OF BOTH LEGS: ICD-10-CM

## 2023-08-14 PROCEDURE — 27000999 HC MEDICAL RECORD PHOTO DOCUMENTATION

## 2023-08-14 PROCEDURE — 99212 OFFICE O/P EST SF 10 MIN: CPT | Mod: 25

## 2023-08-14 PROCEDURE — 29580 STRAPPING UNNA BOOT: CPT

## 2023-08-14 NOTE — PROGRESS NOTES
Home Health: NA  Smoker   [] Yes   [x] No   Diabetic  [x] Yes  [] No   Vascular Surgeon: NS (Dr. Sevilla -- Cardiologist)  Vascular procedures [] Yes [x] No    Recent Ultrasounds [x] Yes [] No   If so, when were they done? 06/26/2023  Compression Pumps  [] Yes [x] No      8/14/23:  Right ankle - 29.0  Right calf - 42.5  Left ankle - 30.0  Left calf - 45.5    8/7/23:  Right ankle - 27.5  Right calf -41.5  Left ankle -  28  Left calf -43    Doppler done in office? [] Yes [x] No        Right dorsalis:     Right posterior:     Left dorsalis:     Left posterior:  Is the patient eligible for a graft, and/or currently grafting? [] Yes [x] No    If so, which one/size?          Subjective:       Patient ID: Chepe Peralta Sr. is a 72 y.o. male.    Chief Complaint: Venous Ulcer (Right lower leg /Left lower leg /)    HPI    6/16/23 (Dr. Ann) - 72-year-old white male, who was referred from the emergency room several days ago, for stasis dermatitis and possible early cellulitis both lower legs.  He also has some edema around his ankles lower legs and feet.  He had this a few years ago also.  He has never been diagnosed with venous insufficiency as for a as he knows.  He also has not been diagnosed with arterial peripheral disease.  He does have a history of CHF, with atrial fibrillation.  He is scheduled to see his cardiologist soon.  He tells me that the erythema has improved since he was in the emergency room last week.  He also has some small blisters posteriorly on both calves.  There was no venous reflux noted in either leg on a venous ultrasound in October 2020.  Presented to [facility] ED on 06/11/2023 with c/o leg swelling and redness with blisters appearing. Patient was given PO Augmentin and told to make an appointment with wound care. Is a patient of Dr. Sevilla with [facility] but denies ever having any ultrasounds of his legs and has never has any stents placed in legs. Has had a CABG done in the past; patient in unsure of  "exact date but states it has been around 10-12 years and is currently taking Xarelto. DMII with a CBG of 131 today. Has full sensation of his feet. [place] Rosario is 10/10.     8/7/23 - Mr. Peralta returns to clinic today for follow up on his bilateral lower extremities.  He was seen at wound care on 06/16/2023 by Dr. Ann and was given steroid cream and had ultrasounds ordered. Ultrasounds completed on 06/26/2023. Patient has been using hibiclens at home and the bilateral legs have improved.  There is still hemosiderin staining present, as well as lipodermatosclerosis.  Today the majority of the blistered areas are resolved.  We did review his ultrasound results and because there are no significant arterial concerns we will move forward with application of unna boots. There is very little drainage noted, and because of that we will leave the unna boots in place for one week without change.     8/14/23 - The patient returns today for his bilateral lower extremities.  He did have a small open area on the left lower extremity which has, for the most part, resolved.  There is still some very slight amount of drainage, however, the Unna boots do appear to be working.  We are measuring for compression leaves and will be having them go out to assess the patient for those pumps.  For now, we will continue to apply the Unna boots bilaterally.  Because there is very scant amount of drainage he is able to leave the Unna boots in place for 1 week without change, so does not need home health at this time.      Review of Systems   Constitutional: Negative.    HENT: Negative.     Respiratory: Negative.     Cardiovascular: Negative.    Skin:         As documented in the HPI.   All other systems reviewed and are negative.        Objective:      Vitals:    08/14/23 1123   BP: 139/63   Pulse: (!) 57   Resp: 17   Weight: 86.2 kg (190 lb)   Height: 5' 10" (1.778 m)        Physical Exam  Vitals and nursing note reviewed. "   Constitutional:       Appearance: Normal appearance.   Cardiovascular:      Rate and Rhythm: Normal rate.   Pulmonary:      Effort: Pulmonary effort is normal.   Skin:     General: Skin is warm and dry.      Comments: Blisters to bilateral calves.   Neurological:      Mental Status: He is alert.            Altered Skin Integrity 06/16/23 1024 Right lower Leg #1 (Active)   06/16/23 1024   Altered Skin Integrity Present on Admission - Did Patient arrive to the hospital with altered skin?: yes   Side: Right   Orientation: lower   Location: Leg   Wound Number: #1   Is this injury device related?: No   Primary Wound Type:    Description of Altered Skin Integrity:    Ankle-Brachial Index:    Pulses:    Removal Indication and Assessment:    Wound Outcome:    (Retired) Wound Length (cm):    (Retired) Wound Width (cm):    (Retired) Depth (cm):    Wound Description (Comments):    Removal Indications:    Description of Altered Skin Integrity Intact skin with non-blanchable redness of localized area 08/14/23 1115   Dressing Appearance Intact 08/14/23 1115   Drainage Amount Scant 08/14/23 1115   Drainage Characteristics/Odor Clear 08/14/23 1115   Appearance Intact;Dry;Pink 08/14/23 1115   Tissue loss description Partial thickness 08/14/23 1115   Periwound Area Intact 08/14/23 1115   Wound Length (cm) 0.1 cm 08/14/23 1115   Wound Width (cm) 0.1 cm 08/14/23 1115   Wound Depth (cm) 0.1 cm 08/14/23 1115   Wound Volume (cm^3) 0.001 cm^3 08/14/23 1115   Wound Surface Area (cm^2) 0.01 cm^2 08/14/23 1115   Care Cleansed with:;Wound cleanser 08/14/23 1115   Dressing Applied 08/14/23 1115   Compression Unna's Boot;Three layer compression 08/14/23 1115   Dressing Change Due 08/21/23 08/14/23 1115            Altered Skin Integrity 06/16/23 1024 Left lower Leg #2 (Active)   06/16/23 1024   Altered Skin Integrity Present on Admission - Did Patient arrive to the hospital with altered skin?: yes   Side: Left   Orientation: lower   Location:  Leg   Wound Number: #2   Is this injury device related?: No   Primary Wound Type:    Description of Altered Skin Integrity:    Ankle-Brachial Index:    Pulses:    Removal Indication and Assessment:    Wound Outcome:    (Retired) Wound Length (cm):    (Retired) Wound Width (cm):    (Retired) Depth (cm):    Wound Description (Comments):    Removal Indications:    Description of Altered Skin Integrity Full thickness tissue loss. Subcutaneous fat may be visible but bone, tendon or muscle are not exposed 08/14/23 1115   Dressing Appearance Intact;Moist drainage 08/14/23 1115   Drainage Amount Small 08/14/23 1115   Drainage Characteristics/Odor Serosanguineous;Clear;Yellow;Sanguineous 08/14/23 1115   Appearance Epithelialization;Eschar;Slough;Intact 08/14/23 1115   Tissue loss description Full thickness 08/14/23 1115   Periwound Area Intact 08/14/23 1115   Wound Length (cm) 2 cm 08/14/23 1115   Wound Width (cm) 0.7 cm 08/14/23 1115   Wound Depth (cm) 0.2 cm 08/14/23 1115   Wound Volume (cm^3) 0.28 cm^3 08/14/23 1115   Wound Surface Area (cm^2) 1.4 cm^2 08/14/23 1115   Care Cleansed with:;Wound cleanser;Debrided 08/14/23 1115   Dressing Applied 08/14/23 1115   Compression Unna's Boot;Three layer compression 08/14/23 1115   Dressing Change Due 08/21/23 08/14/23 1115       8/14/23    Left lower leg  Calsandhya fortea boot, kerlix, coban      Right lower leg   Calamine unna boot, kerlix, coban  Assessment:         ICD-10-CM ICD-9-CM   1. Venous stasis dermatitis of both lower extremities  I87.2 454.1   2. Verruca vulgaris of lower extremity  B07.9 078.10   3. Chronic venous insufficiency  I87.2 459.81   4. Stasis dermatitis of both legs  I87.2 454.1           Procedures:     Mechanical debridement only     Excisional debridement performed:  [] Yes [] No   Selective debridement performed:  [] Yes [] No   Mechanical debridement performed:  [x] Yes [] No   Silver nitrate applied :    [] Yes [] No   Labs ordered this visit:   [] Yes  [] No   Imaging ordered this visit:   [] Yes [] No   Tissue pathology and/or culture taken:  [] Yes [] No     Imaging:  Arterial:  FINDINGS:  Ultrasound Doppler and color flow imaging were performed on the arteries of the lower extremities bilaterally. There is scattered plaque formation at the arteries of lower extremities bilaterally with less than 30% stenosis at the common femoral arteries bilaterally.  A tri phasic flow wave pattern is evident throughout the visualized arteries of the lower extremities bilaterally.   Impression:   1. Scattered atherosclerotic plaque formation throughout the arteries of the lower extremities bilaterally with less than 30% stenosis at the common femoral arteries bilaterally.    Venous:  FINDINGS:  There is normal compression and flow noted in the  common femoral vein, superficial femoral vein, popliteal vein, and  posterior tibial veinbilaterally.  No evidence of deep venous thrombosis is identified.  The left greater saphenous vein is surgically absent.  No venous reflux is identified.  Impression:   1.  No deep venous thrombosis identified to the lower extremities bilaterally   2.  Surgically absent left greater saphenous vein          Procedures:  HOME HEALTH AGENCY: none  TIMES PER WEEK/DAYS:  ORDERS:  Right lower leg wounds: Leave current dressing, calamine unna boot, in place for one week. Do not remove until next wound care visit on 08/21/2023  Left lower leg wounds: Leave current dressing, calamine unna boot, in place for one week. Do not remove until next wound care visit on 08/21/2023        Follow up in about 1 week (around 8/21/2023) for LEGS.        Electronically signed,    Marta Heard

## 2023-08-22 ENCOUNTER — HOSPITAL ENCOUNTER (OUTPATIENT)
Dept: WOUND CARE | Facility: HOSPITAL | Age: 73
Discharge: HOME | End: 2023-08-22
Attending: NURSE PRACTITIONER
Payer: MEDICARE

## 2023-08-22 VITALS
BODY MASS INDEX: 27.2 KG/M2 | HEART RATE: 51 BPM | WEIGHT: 190 LBS | HEIGHT: 70 IN | DIASTOLIC BLOOD PRESSURE: 60 MMHG | SYSTOLIC BLOOD PRESSURE: 136 MMHG | RESPIRATION RATE: 18 BRPM

## 2023-08-22 DIAGNOSIS — I87.2 CHRONIC VENOUS INSUFFICIENCY: ICD-10-CM

## 2023-08-22 DIAGNOSIS — I87.2 STASIS DERMATITIS OF BOTH LEGS: ICD-10-CM

## 2023-08-22 DIAGNOSIS — I87.2 VENOUS STASIS DERMATITIS OF BOTH LOWER EXTREMITIES: Primary | ICD-10-CM

## 2023-08-22 DIAGNOSIS — B07.9 VERRUCA VULGARIS OF LOWER EXTREMITY: ICD-10-CM

## 2023-08-22 PROCEDURE — 99212 OFFICE O/P EST SF 10 MIN: CPT

## 2023-08-22 PROCEDURE — 29580 STRAPPING UNNA BOOT: CPT

## 2023-08-22 PROCEDURE — 27000999 HC MEDICAL RECORD PHOTO DOCUMENTATION

## 2023-08-22 NOTE — ADDENDUM NOTE
Encounter addended by: Fatou Eubanks MA on: 8/22/2023 2:11 PM   Actions taken: LDA properties accepted

## 2023-08-22 NOTE — PROGRESS NOTES
Home Health: NA  Smoker   [] Yes   [x] No   Diabetic  [x] Yes  [] No   Vascular Surgeon: NS (Dr. Sevilla -- Cardiologist)  Vascular procedures [] Yes [x] No    Recent Ultrasounds [x] Yes [] No   If so, when were they done? 06/26/2023  Compression Pumps  [] Yes [x] No      8/14/23:  Right ankle - 29.0  Right calf - 42.5  Left ankle - 30.0  Left calf - 45.5    8/7/23:  Right ankle - 27.5  Right calf -41.5  Left ankle -  28  Left calf -43    8/22/23:  Right ankle - 29.5  Right calf - 40.2  Left ankle - 29..5  Left calf- 44    Doppler done in office? [] Yes [x] No        Right dorsalis:     Right posterior:     Left dorsalis:     Left posterior:  Is the patient eligible for a graft, and/or currently grafting? [] Yes [x] No    If so, which one/size?      Subjective:       Patient ID: Chepe Peralta Sr. is a 72 y.o. male.    Chief Complaint: Venous Ulcer (Right lower leg /Left lower leg )    HPI    6/16/23 (Dr. Ann) - 72-year-old white male, who was referred from the emergency room several days ago, for stasis dermatitis and possible early cellulitis both lower legs.  He also has some edema around his ankles lower legs and feet.  He had this a few years ago also.  He has never been diagnosed with venous insufficiency as for a as he knows.  He also has not been diagnosed with arterial peripheral disease.  He does have a history of CHF, with atrial fibrillation.  He is scheduled to see his cardiologist soon.  He tells me that the erythema has improved since he was in the emergency room last week.  He also has some small blisters posteriorly on both calves.  There was no venous reflux noted in either leg on a venous ultrasound in October 2020.  Presented to [facility] ED on 06/11/2023 with c/o leg swelling and redness with blisters appearing. Patient was given PO Augmentin and told to make an appointment with wound care. Is a patient of Dr. Sevilla with [facility] but denies ever having any ultrasounds of his legs and has never has any  stents placed in legs. Has had a CABG done in the past; patient in unsure of exact date but states it has been around 10-12 years and is currently taking Xarelto. DMII with a CBG of 131 today. Has full sensation of his feet. Kristen Rosario is 10/10.     8/7/23 - Mr. Peralta returns to clinic today for follow up on his bilateral lower extremities.  He was seen at wound care on 06/16/2023 by Dr. Ann and was given steroid cream and had ultrasounds ordered. Ultrasounds completed on 06/26/2023. Patient has been using hibiclens at home and the bilateral legs have improved.  There is still hemosiderin staining present, as well as lipodermatosclerosis.  Today the majority of the blistered areas are resolved.  We did review his ultrasound results and because there are no significant arterial concerns we will move forward with application of unna boots. There is very little drainage noted, and because of that we will leave the unna boots in place for one week without change.     8/14/23 - The patient returns today for his bilateral lower extremities.  He did have a small open area on the left lower extremity which has, for the most part, resolved.  There is still some very slight amount of drainage, however, the Unna boots do appear to be working.  We are measuring for compression leaves and will be having them go out to assess the patient for those pumps.  For now, we will continue to apply the Unna boots bilaterally.  Because there is very scant amount of drainage he is able to leave the Unna boots in place for 1 week without change, so does not need home health at this time.    8/22/23 - Mr. Peralta is following up today for his bilateral lower extremities.  Both legs were assessed today and there are no open areas on either legs.  He will return in 1 month for followup but in the meantime he was given tubigrips to wear on a daily basis.  He was instructed to call should any areas open prior to the 1 month  "visit.      Review of Systems   Constitutional: Negative.    HENT: Negative.     Respiratory: Negative.     Cardiovascular: Negative.    Skin:         As documented in the HPI.   All other systems reviewed and are negative.        Objective:      Vitals:    08/22/23 1050   BP: 136/60   Pulse: (!) 51   Resp: 18   Weight: 86.2 kg (190 lb)   Height: 5' 10" (1.778 m)        Physical Exam  Vitals and nursing note reviewed.   Constitutional:       Appearance: Normal appearance.   Cardiovascular:      Rate and Rhythm: Normal rate.   Pulmonary:      Effort: Pulmonary effort is normal.   Skin:     General: Skin is warm and dry.      Comments: Blisters to bilateral calves.   Neurological:      Mental Status: He is alert.            Altered Skin Integrity 06/16/23 1024 Right lower Leg #1 (Active)   06/16/23 1024   Altered Skin Integrity Present on Admission - Did Patient arrive to the hospital with altered skin?: yes   Side: Right   Orientation: lower   Location: Leg   Wound Number: #1   Is this injury device related?: No   Primary Wound Type:    Description of Altered Skin Integrity:    Ankle-Brachial Index:    Pulses:    Removal Indication and Assessment:    Wound Outcome:    (Retired) Wound Length (cm):    (Retired) Wound Width (cm):    (Retired) Depth (cm):    Wound Description (Comments):    Removal Indications:    Description of Altered Skin Integrity Intact skin with non-blanchable redness of localized area 08/22/23 1044   Dressing Appearance Intact 08/22/23 1044   Drainage Amount None 08/22/23 1044   Wound Length (cm) 0 cm 08/22/23 1044   Wound Width (cm) 0 cm 08/22/23 1044   Wound Depth (cm) 0 cm 08/22/23 1044   Wound Volume (cm^3) 0 cm^3 08/22/23 1044   Wound Surface Area (cm^2) 0 cm^2 08/22/23 1044   Care Cleansed with:;Wound cleanser 08/22/23 1044   Dressing Applied 08/22/23 1044   Compression Tubular elasticized bandage 08/22/23 1044            Altered Skin Integrity 06/16/23 1024 Left lower Leg #2 (Active) "   06/16/23 1024   Altered Skin Integrity Present on Admission - Did Patient arrive to the hospital with altered skin?: yes   Side: Left   Orientation: lower   Location: Leg   Wound Number: #2   Is this injury device related?: No   Primary Wound Type:    Description of Altered Skin Integrity:    Ankle-Brachial Index:    Pulses:    Removal Indication and Assessment:    Wound Outcome:    (Retired) Wound Length (cm):    (Retired) Wound Width (cm):    (Retired) Depth (cm):    Wound Description (Comments):    Removal Indications:    Description of Altered Skin Integrity Intact skin with non-blanchable redness of localized area 08/22/23 1044   Dressing Appearance Intact 08/22/23 1044   Drainage Amount None 08/22/23 1044   Wound Edges Rolled/closed 08/22/23 1044   Wound Length (cm) 0 cm 08/22/23 1044   Wound Width (cm) 0 cm 08/22/23 1044   Wound Depth (cm) 0 cm 08/22/23 1044   Wound Volume (cm^3) 0 cm^3 08/22/23 1044   Wound Surface Area (cm^2) 0 cm^2 08/22/23 1044   Care Cleansed with:;Wound cleanser 08/22/23 1044   Dressing Applied 08/22/23 1044   Compression Tubular elasticized bandage 08/22/23 1044     8/22/23      Left lower leg  F Tubigrip      Right lower leg  F Tubigrip  TR  Assessment:         ICD-10-CM ICD-9-CM   1. Venous stasis dermatitis of both lower extremities  I87.2 454.1   2. Verruca vulgaris of lower extremity  B07.9 078.10   3. Chronic venous insufficiency  I87.2 459.81   4. Stasis dermatitis of both legs  I87.2 454.1         Procedures:     Mechanical debridement only     Excisional debridement performed:  [] Yes [] No   Selective debridement performed:  [] Yes [] No   Mechanical debridement performed:  [] Yes [] No   Silver nitrate applied :    [] Yes [] No   Labs ordered this visit:   [] Yes [] No   Imaging ordered this visit:   [] Yes [] No   Tissue pathology and/or culture taken:  [] Yes [] No     Imaging:  Arterial:  FINDINGS:  Ultrasound Doppler and color flow imaging were performed on the  arteries of the lower extremities bilaterally. There is scattered plaque formation at the arteries of lower extremities bilaterally with less than 30% stenosis at the common femoral arteries bilaterally.  A tri phasic flow wave pattern is evident throughout the visualized arteries of the lower extremities bilaterally.   Impression:   1. Scattered atherosclerotic plaque formation throughout the arteries of the lower extremities bilaterally with less than 30% stenosis at the common femoral arteries bilaterally.    Venous:  FINDINGS:  There is normal compression and flow noted in the  common femoral vein, superficial femoral vein, popliteal vein, and  posterior tibial veinbilaterally.  No evidence of deep venous thrombosis is identified.  The left greater saphenous vein is surgically absent.  No venous reflux is identified.  Impression:   1.  No deep venous thrombosis identified to the lower extremities bilaterally   2.  Surgically absent left greater saphenous vein      Procedures:  HOME HEALTH AGENCY: none  TIMES PER WEEK/DAYS:  ORDERS:  Bilateral legs:   Apply F tubigrip to bilateral legs daily for edema control    Follow up in about 1 month (around 9/22/2023).        Electronically signed,    Marta Heard

## 2023-09-01 ENCOUNTER — HOSPITAL ENCOUNTER (OUTPATIENT)
Dept: WOUND CARE | Facility: HOSPITAL | Age: 73
Discharge: HOME | End: 2023-09-01
Attending: FAMILY MEDICINE
Payer: MEDICARE

## 2023-09-01 VITALS
SYSTOLIC BLOOD PRESSURE: 141 MMHG | DIASTOLIC BLOOD PRESSURE: 81 MMHG | WEIGHT: 190 LBS | BODY MASS INDEX: 27.2 KG/M2 | HEIGHT: 70 IN | RESPIRATION RATE: 18 BRPM | HEART RATE: 62 BPM

## 2023-09-01 DIAGNOSIS — R60.0 VENOUS STASIS ULCER OF RIGHT LOWER LEG WITH EDEMA OF RIGHT LOWER LEG: ICD-10-CM

## 2023-09-01 DIAGNOSIS — L97.919 VENOUS STASIS ULCER OF RIGHT LOWER LEG WITH EDEMA OF RIGHT LOWER LEG: ICD-10-CM

## 2023-09-01 DIAGNOSIS — I83.891 VENOUS STASIS ULCER OF RIGHT LOWER LEG WITH EDEMA OF RIGHT LOWER LEG: ICD-10-CM

## 2023-09-01 DIAGNOSIS — I83.019 VENOUS STASIS ULCER OF RIGHT LOWER LEG WITH EDEMA OF RIGHT LOWER LEG: ICD-10-CM

## 2023-09-01 DIAGNOSIS — I87.2 CHRONIC VENOUS INSUFFICIENCY: Primary | ICD-10-CM

## 2023-09-01 PROCEDURE — 29580 STRAPPING UNNA BOOT: CPT | Mod: 50

## 2023-09-01 PROCEDURE — 99213 OFFICE O/P EST LOW 20 MIN: CPT | Mod: 25

## 2023-09-01 PROCEDURE — 27000999 HC MEDICAL RECORD PHOTO DOCUMENTATION

## 2023-09-01 NOTE — PROGRESS NOTES
Home Health: NA  Smoker   [] Yes   [x] No   Diabetic  [x] Yes  [] No   Vascular Surgeon: NS (Dr. Sevilla -- Cardiologist)  Vascular procedures [] Yes [x] No    Recent Ultrasounds [x] Yes [] No   If so, when were they done? 06/26/2023  Compression Pumps  [] Yes [x] No      8/14/23:  Right ankle - 29.0  Right calf - 42.5  Left ankle - 30.0  Left calf - 45.5    8/7/23:  Right ankle - 27.5  Right calf -41.5  Left ankle -  28  Left calf -43    8/22/23:  Right ankle - 29.5  Right calf - 40.2  Left ankle - 29..5  Left calf- 44    9/1/23  Right ankle - 29 cm  Right calf - 42.5 cm   Left ankle - 28.5 cm   Left calf - 44.5 cm     Doppler done in office? [] Yes [x] No        Right dorsalis:     Right posterior:     Left dorsalis:     Left posterior:  Is the patient eligible for a graft, and/or currently grafting? [] Yes [x] No    If so, which one/size?    NOTES  Patient reports retaining more fluid over the past week with draining areas to both legs. Patient is scheduled to see Dr. Sevilla with [facility] at the end of this month but plans to call and try to get a sooner appt.       Subjective:       Patient ID: Chepe Peralta Sr. is a 73 y.o. male.    Chief Complaint: Venous Ulcer (Right lower leg /Left lower leg)    HPI    September 1st:  73-year-old white male, with a long history of venous insufficiency, developed a venous stasis ulcer on his right lower leg a few days ago.  This was spontaneous.  He had been using Unna boots in the past.  His edema is worsening now.  He has a history of CHF, and he will be seeing his cardiologist within the next couple of weeks.  There is no history of arterial insufficiency or peripheral vascular disease.  There also some blisters on his left lower leg.      6/16/23 (Dr. Ann) - 72-year-old white male, who was referred from the emergency room several days ago, for stasis dermatitis and possible early cellulitis both lower legs.  He also has some edema around his ankles lower legs and feet.  He had  this a few years ago also.  He has never been diagnosed with venous insufficiency as for a as he knows.  He also has not been diagnosed with arterial peripheral disease.  He does have a history of CHF, with atrial fibrillation.  He is scheduled to see his cardiologist soon.  He tells me that the erythema has improved since he was in the emergency room last week.  He also has some small blisters posteriorly on both calves.  There was no venous reflux noted in either leg on a venous ultrasound in October 2020.  Presented to [place] ED on 06/11/2023 with c/o leg swelling and redness with blisters appearing. Patient was given PO Augmentin and told to make an appointment with wound care. Is a patient of Dr. Sevilla with [facility] but denies ever having any ultrasounds of his legs and has never has any stents placed in legs. Has had a CABG done in the past; patient in unsure of exact date but states it has been around 10-12 years and is currently taking Xarelto. DMII with a CBG of 131 today. Has full sensation of his feet. Kristen Rosario is 10/10.     8/7/23 - Mr. Peralta returns to clinic today for follow up on his bilateral lower extremities.  He was seen at wound care on 06/16/2023 by Dr. Ann and was given steroid cream and had ultrasounds ordered. Ultrasounds completed on 06/26/2023. Patient has been using hibiclens at home and the bilateral legs have improved.  There is still hemosiderin staining present, as well as lipodermatosclerosis.  Today the majority of the blistered areas are resolved.  We did review his ultrasound results and because there are no significant arterial concerns we will move forward with application of unna boots. There is very little drainage noted, and because of that we will leave the unna boots in place for one week without change.     8/14/23 - The patient returns today for his bilateral lower extremities.  He did have a small open area on the left lower extremity which has, for the most part,  "resolved.  There is still some very slight amount of drainage, however, the Unna boots do appear to be working.  We are measuring for compression leaves and will be having them go out to assess the patient for those pumps.  For now, we will continue to apply the Unna boots bilaterally.  Because there is very scant amount of drainage he is able to leave the Unna boots in place for 1 week without change, so does not need home health at this time.    8/22/23 - Mr. Peralta is following up today for his bilateral lower extremities.  Both legs were assessed today and there are no open areas on either legs.  He will return in 1 month for followup but in the meantime he was given tubigrips to wear on a daily basis.  He was instructed to call should any areas open prior to the 1 month visit.      Review of Systems   Constitutional: Negative.    HENT: Negative.     Respiratory: Negative.     Cardiovascular: Negative.    Skin:         As documented in the HPI.   All other systems reviewed and are negative.        Objective:      Vitals:    09/01/23 1203   BP: (!) 141/81   BP Location: Right arm   Patient Position: Sitting   Pulse: 62   Resp: 18   Weight: 86.2 kg (190 lb)   Height: 5' 10" (1.778 m)        Physical Exam  Vitals and nursing note reviewed.   Constitutional:       Appearance: Normal appearance.   Cardiovascular:      Rate and Rhythm: Normal rate.   Pulmonary:      Effort: Pulmonary effort is normal.   Skin:     General: Skin is warm and dry.      Comments: There is bilateral edema both lower legs and feet.  There is an ulcer to the right lower leg.  It does not appear infected.  I did a mechanical debridement.  There also some blisters on the left lower leg.  There is no obvious cellulitis.   Neurological:      Mental Status: He is alert.            Altered Skin Integrity 09/01/23 1204 Right lower Leg #1 (Active)   09/01/23 1204   Altered Skin Integrity Present on Admission - Did Patient arrive to the hospital with " altered skin?: yes   Side: Right   Orientation: lower   Location: Leg   Wound Number: #1   Is this injury device related?: No   Primary Wound Type:    Description of Altered Skin Integrity:    Ankle-Brachial Index:    Pulses:    Removal Indication and Assessment:    Wound Outcome:    (Retired) Wound Length (cm):    (Retired) Wound Width (cm):    (Retired) Depth (cm):    Wound Description (Comments):    Removal Indications:    Dressing Appearance Moist drainage 09/01/23 1204   Drainage Amount Moderate 09/01/23 1204   Drainage Characteristics/Odor Serosanguineous 09/01/23 1204   Appearance Moist;Pink;Smooth 09/01/23 1204   Tissue loss description Full thickness 09/01/23 1204   Periwound Area Intact;Edematous 09/01/23 1204   Wound Edges Open 09/01/23 1204   Wound Length (cm) 3.8 cm 09/01/23 1204   Wound Width (cm) 4.5 cm 09/01/23 1204   Wound Depth (cm) 0.2 cm 09/01/23 1204   Wound Volume (cm^3) 3.42 cm^3 09/01/23 1204   Wound Surface Area (cm^2) 17.1 cm^2 09/01/23 1204   Care Cleansed with:;Wound cleanser 09/01/23 1204   Dressing Applied;Other (comment) 09/01/23 1204   Dressing Change Due 09/07/23 09/01/23 1204            Altered Skin Integrity 09/01/23 1204 Left lower Leg #2 (Active)   09/01/23 1204   Altered Skin Integrity Present on Admission - Did Patient arrive to the hospital with altered skin?: yes   Side: Left   Orientation: lower   Location: Leg   Wound Number: #2   Is this injury device related?: No   Primary Wound Type:    Description of Altered Skin Integrity:    Ankle-Brachial Index:    Pulses:    Removal Indication and Assessment:    Wound Outcome:    (Retired) Wound Length (cm):    (Retired) Wound Width (cm):    (Retired) Depth (cm):    Wound Description (Comments):    Removal Indications:    Dressing Appearance Moist drainage 09/01/23 1204   Drainage Amount Moderate 09/01/23 1204   Drainage Characteristics/Odor Serosanguineous 09/01/23 1204   Appearance Moist;Pink;Smooth 09/01/23 1204   Tissue loss  description Full thickness 09/01/23 1204   Periwound Area Edematous 09/01/23 1204   Wound Edges Open 09/01/23 1204   Wound Length (cm) 0.2 cm 09/01/23 1204   Wound Width (cm) 0.2 cm 09/01/23 1204   Wound Depth (cm) 0.2 cm 09/01/23 1204   Wound Volume (cm^3) 0.008 cm^3 09/01/23 1204   Wound Surface Area (cm^2) 0.04 cm^2 09/01/23 1204   Care Cleansed with:;Wound cleanser 09/01/23 1204   Dressing Applied;Other (comment) 09/01/23 1204   Dressing Change Due 09/07/23 09/01/23 1204     9/1/23    Left lower leg   calamine unna boot, kerlix, coban   CT      Right lower leg   calamine unna boot, kerlix, coban   CT    Assessment:         ICD-10-CM ICD-9-CM   1. Chronic venous insufficiency  I87.2 459.81   2. Venous stasis ulcer of right lower leg with edema of right lower leg  I83.019 454.8    I83.891 454.0    L97.919     R60.0            Procedures:         Excisional debridement performed:  [] Yes [x] No   Selective debridement performed:  [] Yes [x] No   Mechanical debridement performed:  [x] Yes [] No   Silver nitrate applied :    [] Yes [x] No   Labs ordered this visit:   [] Yes [x] No   Imaging ordered this visit:   [] Yes [] No   Tissue pathology and/or culture taken:  [] Yes [] No     Imaging:  Arterial:  FINDINGS:  Ultrasound Doppler and color flow imaging were performed on the arteries of the lower extremities bilaterally. There is scattered plaque formation at the arteries of lower extremities bilaterally with less than 30% stenosis at the common femoral arteries bilaterally.  A tri phasic flow wave pattern is evident throughout the visualized arteries of the lower extremities bilaterally.   Impression:   1. Scattered atherosclerotic plaque formation throughout the arteries of the lower extremities bilaterally with less than 30% stenosis at the common femoral arteries bilaterally.    Venous:  FINDINGS:  There is normal compression and flow noted in the  common femoral vein, superficial femoral vein, popliteal vein,  and  posterior tibial veinbilaterally.  No evidence of deep venous thrombosis is identified.  The left greater saphenous vein is surgically absent.  No venous reflux is identified.  Impression:   1.  No deep venous thrombosis identified to the lower extremities bilaterally   2.  Surgically absent left greater saphenous vein      Procedures:  HOME HEALTH AGENCY: none  TIMES PER WEEK/DAYS:  ORDERS:  Bilateral legs:  Cleanse with wound cleanser, apply calamine unna boots, kerlix, and coban to be changed on Thursday     Follow up in 6 days (on 9/7/2023) for bilateral legs .      He will see his cardiologist soon.

## 2023-09-11 ENCOUNTER — HOSPITAL ENCOUNTER (INPATIENT)
Facility: HOSPITAL | Age: 73
LOS: 3 days | Discharge: HOME | DRG: 190 | End: 2023-09-15
Attending: GENERAL ACUTE CARE HOSPITAL | Admitting: INTERNAL MEDICINE
Payer: MEDICARE

## 2023-09-11 DIAGNOSIS — R06.02 SOB (SHORTNESS OF BREATH): ICD-10-CM

## 2023-09-11 DIAGNOSIS — I50.9 CONGESTIVE HEART FAILURE, UNSPECIFIED HF CHRONICITY, UNSPECIFIED HEART FAILURE TYPE: ICD-10-CM

## 2023-09-11 DIAGNOSIS — J85.1 ABSCESS OF LOWER LOBE OF RIGHT LUNG WITH PNEUMONIA: ICD-10-CM

## 2023-09-11 DIAGNOSIS — J44.1 COPD EXACERBATION: Primary | ICD-10-CM

## 2023-09-11 LAB
ALBUMIN SERPL-MCNC: 3.3 G/DL (ref 3.4–4.8)
ALBUMIN/GLOB SERPL: 0.8 RATIO (ref 1.1–2)
ALP SERPL-CCNC: 123 UNIT/L (ref 40–150)
ALT SERPL-CCNC: 18 UNIT/L (ref 0–55)
APTT PPP: 44.6 SECONDS (ref 24.6–37.2)
AST SERPL-CCNC: 24 UNIT/L (ref 5–34)
BASOPHILS # BLD AUTO: 0.04 X10(3)/MCL
BASOPHILS NFR BLD AUTO: 0.6 %
BILIRUB SERPL-MCNC: 2.5 MG/DL
BNP BLD-MCNC: 2889.1 PG/ML
BUN SERPL-MCNC: 8 MG/DL (ref 8.4–25.7)
CALCIUM SERPL-MCNC: 9.8 MG/DL (ref 8.8–10)
CHLORIDE SERPL-SCNC: 103 MMOL/L (ref 98–107)
CO2 SERPL-SCNC: 27 MMOL/L (ref 23–31)
CREAT SERPL-MCNC: 0.86 MG/DL (ref 0.73–1.18)
EOSINOPHIL # BLD AUTO: 0.21 X10(3)/MCL (ref 0–0.9)
EOSINOPHIL NFR BLD AUTO: 3.3 %
ERYTHROCYTE [DISTWIDTH] IN BLOOD BY AUTOMATED COUNT: 18.8 % (ref 11.5–17)
FLUAV AG UPPER RESP QL IA.RAPID: NOT DETECTED
FLUBV AG UPPER RESP QL IA.RAPID: NOT DETECTED
GFR SERPLBLD CREATININE-BSD FMLA CKD-EPI: >60 MLS/MIN/1.73/M2
GLOBULIN SER-MCNC: 4 GM/DL (ref 2.4–3.5)
GLUCOSE SERPL-MCNC: 169 MG/DL (ref 82–115)
HCT VFR BLD AUTO: 35.6 % (ref 42–52)
HGB BLD-MCNC: 11.4 G/DL (ref 14–18)
IMM GRANULOCYTES # BLD AUTO: 0.02 X10(3)/MCL (ref 0–0.04)
IMM GRANULOCYTES NFR BLD AUTO: 0.3 %
INR PPP: 1.4
LACTATE SERPL-SCNC: 1.2 MMOL/L (ref 0.5–2.2)
LYMPHOCYTES # BLD AUTO: 1.11 X10(3)/MCL (ref 0.6–4.6)
LYMPHOCYTES NFR BLD AUTO: 17.7 %
MAGNESIUM SERPL-MCNC: 1.9 MG/DL (ref 1.6–2.6)
MCH RBC QN AUTO: 30.1 PG (ref 27–31)
MCHC RBC AUTO-ENTMCNC: 32 G/DL (ref 33–36)
MCV RBC AUTO: 93.9 FL (ref 80–94)
MONOCYTES # BLD AUTO: 0.64 X10(3)/MCL (ref 0.1–1.3)
MONOCYTES NFR BLD AUTO: 10.2 %
NEUTROPHILS # BLD AUTO: 4.26 X10(3)/MCL (ref 2.1–9.2)
NEUTROPHILS NFR BLD AUTO: 67.9 %
PLATELET # BLD AUTO: 193 X10(3)/MCL (ref 130–400)
PMV BLD AUTO: 10.4 FL (ref 7.4–10.4)
POTASSIUM SERPL-SCNC: 3.3 MMOL/L (ref 3.5–5.1)
PROT SERPL-MCNC: 7.3 GM/DL (ref 5.8–7.6)
PROTHROMBIN TIME: 17.7 SECONDS (ref 12.5–14.5)
RBC # BLD AUTO: 3.79 X10(6)/MCL (ref 4.7–6.1)
SARS-COV-2 RNA RESP QL NAA+PROBE: NOT DETECTED
SODIUM SERPL-SCNC: 140 MMOL/L (ref 136–145)
TROPONIN I SERPL-MCNC: 0.06 NG/ML (ref 0–0.04)
TROPONIN I SERPL-MCNC: 0.06 NG/ML (ref 0–0.04)
TSH SERPL-ACNC: 1.21 UIU/ML (ref 0.35–4.94)
WBC # SPEC AUTO: 6.28 X10(3)/MCL (ref 4.5–11.5)

## 2023-09-11 PROCEDURE — 84484 ASSAY OF TROPONIN QUANT: CPT | Performed by: GENERAL ACUTE CARE HOSPITAL

## 2023-09-11 PROCEDURE — 25000242 PHARM REV CODE 250 ALT 637 W/ HCPCS: Performed by: GENERAL ACUTE CARE HOSPITAL

## 2023-09-11 PROCEDURE — 25000003 PHARM REV CODE 250: Performed by: GENERAL ACUTE CARE HOSPITAL

## 2023-09-11 PROCEDURE — 83735 ASSAY OF MAGNESIUM: CPT | Performed by: GENERAL ACUTE CARE HOSPITAL

## 2023-09-11 PROCEDURE — 93005 ELECTROCARDIOGRAM TRACING: CPT

## 2023-09-11 PROCEDURE — 0240U COVID/FLU A&B PCR: CPT | Performed by: GENERAL ACUTE CARE HOSPITAL

## 2023-09-11 PROCEDURE — 99285 EMERGENCY DEPT VISIT HI MDM: CPT | Mod: 25

## 2023-09-11 PROCEDURE — 83605 ASSAY OF LACTIC ACID: CPT | Performed by: GENERAL ACUTE CARE HOSPITAL

## 2023-09-11 PROCEDURE — 85730 THROMBOPLASTIN TIME PARTIAL: CPT | Performed by: GENERAL ACUTE CARE HOSPITAL

## 2023-09-11 PROCEDURE — 93010 ELECTROCARDIOGRAM REPORT: CPT | Mod: ,,, | Performed by: INTERNAL MEDICINE

## 2023-09-11 PROCEDURE — 93010 EKG 12-LEAD: ICD-10-PCS | Mod: ,,, | Performed by: INTERNAL MEDICINE

## 2023-09-11 PROCEDURE — 85025 COMPLETE CBC W/AUTO DIFF WBC: CPT | Performed by: GENERAL ACUTE CARE HOSPITAL

## 2023-09-11 PROCEDURE — 85610 PROTHROMBIN TIME: CPT | Performed by: GENERAL ACUTE CARE HOSPITAL

## 2023-09-11 PROCEDURE — 83880 ASSAY OF NATRIURETIC PEPTIDE: CPT | Performed by: GENERAL ACUTE CARE HOSPITAL

## 2023-09-11 PROCEDURE — 63600175 PHARM REV CODE 636 W HCPCS: Performed by: GENERAL ACUTE CARE HOSPITAL

## 2023-09-11 PROCEDURE — 84443 ASSAY THYROID STIM HORMONE: CPT | Performed by: GENERAL ACUTE CARE HOSPITAL

## 2023-09-11 PROCEDURE — 96365 THER/PROPH/DIAG IV INF INIT: CPT

## 2023-09-11 PROCEDURE — 96368 THER/DIAG CONCURRENT INF: CPT

## 2023-09-11 PROCEDURE — 80053 COMPREHEN METABOLIC PANEL: CPT | Performed by: GENERAL ACUTE CARE HOSPITAL

## 2023-09-11 PROCEDURE — 94761 N-INVAS EAR/PLS OXIMETRY MLT: CPT

## 2023-09-11 PROCEDURE — 96375 TX/PRO/DX INJ NEW DRUG ADDON: CPT

## 2023-09-11 PROCEDURE — 94640 AIRWAY INHALATION TREATMENT: CPT

## 2023-09-11 RX ORDER — POTASSIUM CHLORIDE 20 MEQ/1
40 TABLET, EXTENDED RELEASE ORAL
Status: COMPLETED | OUTPATIENT
Start: 2023-09-11 | End: 2023-09-11

## 2023-09-11 RX ORDER — IPRATROPIUM BROMIDE AND ALBUTEROL SULFATE 2.5; .5 MG/3ML; MG/3ML
3 SOLUTION RESPIRATORY (INHALATION)
Status: COMPLETED | OUTPATIENT
Start: 2023-09-11 | End: 2023-09-11

## 2023-09-11 RX ORDER — ALBUTEROL SULFATE 0.83 MG/ML
2.5 SOLUTION RESPIRATORY (INHALATION) ONCE
Status: DISCONTINUED | OUTPATIENT
Start: 2023-09-11 | End: 2023-09-12

## 2023-09-11 RX ORDER — IPRATROPIUM BROMIDE 0.5 MG/2.5ML
0.5 SOLUTION RESPIRATORY (INHALATION) ONCE
Status: DISCONTINUED | OUTPATIENT
Start: 2023-09-11 | End: 2023-09-12

## 2023-09-11 RX ORDER — FUROSEMIDE 10 MG/ML
40 INJECTION INTRAMUSCULAR; INTRAVENOUS
Status: COMPLETED | OUTPATIENT
Start: 2023-09-11 | End: 2023-09-11

## 2023-09-11 RX ADMIN — POTASSIUM CHLORIDE 40 MEQ: 1500 TABLET, EXTENDED RELEASE ORAL at 09:09

## 2023-09-11 RX ADMIN — FUROSEMIDE 40 MG: 10 INJECTION, SOLUTION INTRAMUSCULAR; INTRAVENOUS at 10:09

## 2023-09-11 RX ADMIN — AZITHROMYCIN MONOHYDRATE 500 MG: 500 INJECTION, POWDER, LYOPHILIZED, FOR SOLUTION INTRAVENOUS at 09:09

## 2023-09-11 RX ADMIN — CEFTRIAXONE SODIUM 1 G: 1 INJECTION, POWDER, FOR SOLUTION INTRAMUSCULAR; INTRAVENOUS at 09:09

## 2023-09-11 RX ADMIN — IPRATROPIUM BROMIDE AND ALBUTEROL SULFATE 3 ML: .5; 3 SOLUTION RESPIRATORY (INHALATION) at 08:09

## 2023-09-12 PROBLEM — R06.02 SOB (SHORTNESS OF BREATH): Status: ACTIVE | Noted: 2023-09-12

## 2023-09-12 PROBLEM — J44.1 COPD EXACERBATION: Status: ACTIVE | Noted: 2023-09-12

## 2023-09-12 PROBLEM — I50.9 CONGESTIVE HEART FAILURE: Status: ACTIVE | Noted: 2023-09-12

## 2023-09-12 LAB
EST. AVERAGE GLUCOSE BLD GHB EST-MCNC: 139.9 MG/DL
HBA1C MFR BLD: 6.5 %
POCT GLUCOSE: 153 MG/DL (ref 70–110)
POCT GLUCOSE: 179 MG/DL (ref 70–110)
POCT GLUCOSE: 193 MG/DL (ref 70–110)
POCT GLUCOSE: 195 MG/DL (ref 70–110)

## 2023-09-12 PROCEDURE — 83036 HEMOGLOBIN GLYCOSYLATED A1C: CPT | Performed by: INTERNAL MEDICINE

## 2023-09-12 PROCEDURE — 63600175 PHARM REV CODE 636 W HCPCS: Performed by: INTERNAL MEDICINE

## 2023-09-12 PROCEDURE — 97161 PT EVAL LOW COMPLEX 20 MIN: CPT

## 2023-09-12 PROCEDURE — 94640 AIRWAY INHALATION TREATMENT: CPT

## 2023-09-12 PROCEDURE — 25000003 PHARM REV CODE 250: Performed by: INTERNAL MEDICINE

## 2023-09-12 PROCEDURE — 21400001 HC TELEMETRY ROOM

## 2023-09-12 PROCEDURE — 27000221 HC OXYGEN, UP TO 24 HOURS

## 2023-09-12 PROCEDURE — A4216 STERILE WATER/SALINE, 10 ML: HCPCS | Performed by: INTERNAL MEDICINE

## 2023-09-12 PROCEDURE — 94761 N-INVAS EAR/PLS OXIMETRY MLT: CPT

## 2023-09-12 PROCEDURE — 25000242 PHARM REV CODE 250 ALT 637 W/ HCPCS: Performed by: INTERNAL MEDICINE

## 2023-09-12 RX ORDER — INSULIN ASPART 100 [IU]/ML
0-5 INJECTION, SOLUTION INTRAVENOUS; SUBCUTANEOUS
Status: DISCONTINUED | OUTPATIENT
Start: 2023-09-12 | End: 2023-09-15 | Stop reason: HOSPADM

## 2023-09-12 RX ORDER — IBUPROFEN 200 MG
24 TABLET ORAL
Status: DISCONTINUED | OUTPATIENT
Start: 2023-09-12 | End: 2023-09-15 | Stop reason: HOSPADM

## 2023-09-12 RX ORDER — FUROSEMIDE 40 MG/1
40 TABLET ORAL 2 TIMES DAILY
Status: DISCONTINUED | OUTPATIENT
Start: 2023-09-11 | End: 2023-09-15 | Stop reason: HOSPADM

## 2023-09-12 RX ORDER — SODIUM CHLORIDE 0.9 % (FLUSH) 0.9 %
10 SYRINGE (ML) INJECTION
Status: DISCONTINUED | OUTPATIENT
Start: 2023-09-12 | End: 2023-09-15 | Stop reason: HOSPADM

## 2023-09-12 RX ORDER — CARVEDILOL 25 MG/1
25 TABLET ORAL 2 TIMES DAILY
Status: DISCONTINUED | OUTPATIENT
Start: 2023-09-12 | End: 2023-09-15 | Stop reason: HOSPADM

## 2023-09-12 RX ORDER — GUAIFENESIN 100 MG/5ML
200 SOLUTION ORAL EVERY 4 HOURS PRN
Status: DISCONTINUED | OUTPATIENT
Start: 2023-09-12 | End: 2023-09-15 | Stop reason: HOSPADM

## 2023-09-12 RX ORDER — SODIUM CHLORIDE 0.9 % (FLUSH) 0.9 %
3 SYRINGE (ML) INJECTION
Status: DISCONTINUED | OUTPATIENT
Start: 2023-09-12 | End: 2023-09-15 | Stop reason: HOSPADM

## 2023-09-12 RX ORDER — IPRATROPIUM BROMIDE AND ALBUTEROL SULFATE 2.5; .5 MG/3ML; MG/3ML
3 SOLUTION RESPIRATORY (INHALATION) EVERY 4 HOURS
Status: DISCONTINUED | OUTPATIENT
Start: 2023-09-12 | End: 2023-09-15 | Stop reason: HOSPADM

## 2023-09-12 RX ORDER — TAMSULOSIN HYDROCHLORIDE 0.4 MG/1
0.4 CAPSULE ORAL DAILY
Status: DISCONTINUED | OUTPATIENT
Start: 2023-09-12 | End: 2023-09-15 | Stop reason: HOSPADM

## 2023-09-12 RX ORDER — ATORVASTATIN CALCIUM 40 MG/1
80 TABLET, FILM COATED ORAL DAILY
Status: DISCONTINUED | OUTPATIENT
Start: 2023-09-12 | End: 2023-09-15 | Stop reason: HOSPADM

## 2023-09-12 RX ORDER — GLUCAGON 1 MG
1 KIT INJECTION
Status: DISCONTINUED | OUTPATIENT
Start: 2023-09-12 | End: 2023-09-15 | Stop reason: HOSPADM

## 2023-09-12 RX ORDER — DEXTROSE MONOHYDRATE 50 MG/ML
INJECTION, SOLUTION INTRAVENOUS
Status: DISCONTINUED | OUTPATIENT
Start: 2023-09-12 | End: 2023-09-15 | Stop reason: HOSPADM

## 2023-09-12 RX ORDER — IBUPROFEN 200 MG
16 TABLET ORAL
Status: DISCONTINUED | OUTPATIENT
Start: 2023-09-12 | End: 2023-09-15 | Stop reason: HOSPADM

## 2023-09-12 RX ORDER — TALC
6 POWDER (GRAM) TOPICAL NIGHTLY PRN
Status: DISCONTINUED | OUTPATIENT
Start: 2023-09-12 | End: 2023-09-15 | Stop reason: HOSPADM

## 2023-09-12 RX ADMIN — CEFTRIAXONE SODIUM 1 G: 1 INJECTION, POWDER, FOR SOLUTION INTRAMUSCULAR; INTRAVENOUS at 09:09

## 2023-09-12 RX ADMIN — ATORVASTATIN CALCIUM 80 MG: 40 TABLET, FILM COATED ORAL at 09:09

## 2023-09-12 RX ADMIN — IPRATROPIUM BROMIDE AND ALBUTEROL SULFATE 3 ML: 2.5; .5 SOLUTION RESPIRATORY (INHALATION) at 11:09

## 2023-09-12 RX ADMIN — RIVAROXABAN 20 MG: 10 TABLET, FILM COATED ORAL at 05:09

## 2023-09-12 RX ADMIN — CARVEDILOL 25 MG: 25 TABLET, FILM COATED ORAL at 09:09

## 2023-09-12 RX ADMIN — DEXTROSE MONOHYDRATE: 50 INJECTION, SOLUTION INTRAVENOUS at 09:09

## 2023-09-12 RX ADMIN — FUROSEMIDE 40 MG: 40 TABLET ORAL at 05:09

## 2023-09-12 RX ADMIN — FUROSEMIDE 40 MG: 40 TABLET ORAL at 09:09

## 2023-09-12 RX ADMIN — Medication 10 ML: at 09:09

## 2023-09-12 RX ADMIN — GUAIFENESIN 200 MG: 200 SOLUTION ORAL at 09:09

## 2023-09-12 RX ADMIN — TAMSULOSIN HYDROCHLORIDE 0.4 MG: 0.4 CAPSULE ORAL at 09:09

## 2023-09-12 RX ADMIN — AZITHROMYCIN MONOHYDRATE 500 MG: 500 INJECTION, POWDER, LYOPHILIZED, FOR SOLUTION INTRAVENOUS at 10:09

## 2023-09-12 RX ADMIN — IPRATROPIUM BROMIDE AND ALBUTEROL SULFATE 3 ML: 2.5; .5 SOLUTION RESPIRATORY (INHALATION) at 08:09

## 2023-09-12 NOTE — ED PROVIDER NOTES
Encounter Date: 9/11/2023       History     Chief Complaint   Patient presents with    Shortness of Breath     Pt brought in by [facility] with c/o SOB getting worse today.     The patient was brought from  a home by EMS for Sob x 1 day, has triage COPD, CAD, Afib and CHF, venous insufficiency with BL venous stasis dermatitis ( sees Wound clinic on regular base), last time evaluated by cardiologist 1 year ago, has a scheduled appointment at next week, no home oxygen, takes Xarelto 20 mg twice daily, reports that he used rescue inhaler twice today, currently is not smoking, by EMS report patient has 95% pulse ox on room air on the scene, denies chest pain, report cough with clear phlegm (base)    The history is provided by the EMS personnel and the patient.     Review of patient's allergies indicates:  No Known Allergies  Past Medical History:   Diagnosis Date    CHF (congestive heart failure)     Chronic venous stasis dermatitis of both lower extremities     Depression     Diabetes mellitus     Gout, unspecified     Hypertension     Paroxysmal atrial fibrillation      Past Surgical History:   Procedure Laterality Date    CORONARY ARTERY BYPASS GRAFT       Family History   Problem Relation Age of Onset    Diabetes Mother     Heart disease Father      Social History     Tobacco Use    Smoking status: Never    Smokeless tobacco: Current   Substance Use Topics    Alcohol use: Never    Drug use: Never     Review of Systems   Respiratory:  Positive for cough and shortness of breath.    All other systems reviewed and are negative.      Physical Exam     Initial Vitals [09/11/23 2008]   BP Pulse Resp Temp SpO2   (!) 180/64 68 20 97.3 °F (36.3 °C) 95 %      MAP       --         Physical Exam    Nursing note and vitals reviewed.  Constitutional: He appears well-developed and well-nourished.   HENT:   Right Ear: External ear normal.   Left Ear: External ear normal.   Eyes: Pupils are equal, round, and reactive to light.    Neck:   Normal range of motion.  Cardiovascular:  An irregularly irregular rhythm present.           Pulmonary/Chest: Breath sounds normal.   Prolonged expiration, bilateral bronchial breathing, decreased breath sounds on bilateral bases some scattered diffuse wheezing   Musculoskeletal:         General: Tenderness present.      Cervical back: Normal range of motion.      Right lower le+ Edema present.      Left lower le+ Edema present.      Comments: Wearing BL Unna boots     Neurological: He is alert and oriented to person, place, and time.   Psychiatric: His behavior is normal. Thought content normal.         ED Course   Procedures  Labs Reviewed   APTT - Abnormal; Notable for the following components:       Result Value    PTT 44.6 (*)     All other components within normal limits   B-TYPE NATRIURETIC PEPTIDE - Abnormal; Notable for the following components:    Natriuretic Peptide 2,889.1 (*)     All other components within normal limits   COMPREHENSIVE METABOLIC PANEL - Abnormal; Notable for the following components:    Potassium Level 3.3 (*)     Glucose Level 169 (*)     Blood Urea Nitrogen 8.0 (*)     Albumin Level 3.3 (*)     Globulin 4.0 (*)     Albumin/Globulin Ratio 0.8 (*)     Bilirubin Total 2.5 (*)     All other components within normal limits   PROTIME-INR - Abnormal; Notable for the following components:    PT 17.7 (*)     INR 1.4 (*)     All other components within normal limits   TROPONIN I - Abnormal; Notable for the following components:    Troponin-I 0.059 (*)     All other components within normal limits   CBC WITH DIFFERENTIAL - Abnormal; Notable for the following components:    RBC 3.79 (*)     Hgb 11.4 (*)     Hct 35.6 (*)     MCHC 32.0 (*)     RDW 18.8 (*)     All other components within normal limits   TROPONIN I - Abnormal; Notable for the following components:    Troponin-I 0.055 (*)     All other components within normal limits   COVID/FLU A&B PCR - Normal    Narrative:      The Xpert Xpress SARS-CoV-2/FLU/RSV plus is a rapid, multiplexed real-time PCR test intended for the simultaneous qualitative detection and differentiation of SARS-CoV-2, Influenza A, Influenza B, and respiratory syncytial virus (RSV) viral RNA in either nasopharyngeal swab or nasal swab specimens.         LACTIC ACID, PLASMA - Normal   MAGNESIUM - Normal   TSH - Normal   CBC W/ AUTO DIFFERENTIAL    Narrative:     The following orders were created for panel order CBC auto differential.  Procedure                               Abnormality         Status                     ---------                               -----------         ------                     CBC with Differential[[id number]]        Abnormal            Final result                 Please view results for these tests on the individual orders.        ECG Results              EKG 12-lead (In process)  Result time 09/11/23 23:55:00      In process by Interface, Lab In [facility] (09/11/23 23:55:00)                   Narrative:    Test Reason : R06.02,    Vent. Rate : 076 BPM     Atrial Rate : 076 BPM     P-R Int : 280 ms          QRS Dur : 102 ms      QT Int : 422 ms       P-R-T Axes : 000 098 098 degrees     QTc Int : 474 ms    Sinus rhythm with sinus arrhythmia with 1st degree A-V block with  occasional Premature ventricular complexes  Rightward axis  Low voltage QRS  Cannot rule out Anterior infarct ,age undetermined  Abnormal ECG  No previous ECGs available    Referred By: [email or web address]   SELF           Confirmed By:                                   Imaging Results              X-Ray Chest AP Portable (In process)                      Medications   albuterol nebulizer solution 2.5 mg (has no administration in time range)     And   ipratropium 0.02 % nebulizer solution 0.5 mg (has no administration in time range)   albuterol-ipratropium 2.5 mg-0.5 mg/3 mL nebulizer solution 3 mL (3 mLs Nebulization Given 9/11/23 2040)   cefTRIAXone (ROCEPHIN) 1 g in dextrose  5 % in water (D5W) 100 mL IVPB (MB+) (0 g Intravenous Stopped 9/11/23 2221)   azithromycin (ZITHROMAX) 500 mg in dextrose 5 % (D5W) 250 mL IVPB (Vial-Mate) (0 mg Intravenous Stopped 9/11/23 2250)   potassium chloride SA CR tablet 40 mEq (40 mEq Oral Given 9/11/23 2149)   furosemide injection 40 mg (40 mg Intravenous Given 9/11/23 2204)     Medical Decision Making  The patient was brought from  a home by EMS for Sob x 1 day, has triage COPD, CAD, Afib and CHF, venous insufficiency with BL venous stasis dermatitis ( sees Wound clinic on regular base), last time evaluated by cardiologist 1 year ago, has a scheduled appointment at next week, no home oxygen, takes Xarelto 20 mg twice daily, reports that he used rescue inhaler twice today, currently is not smoking, by EMS report patient has 95% pulse ox on room air on the scene, denies chest pain, report cough with clear phlegm (base)  PE revealed no pain/respiratory distress, lung exam revealed prolonged expiration, with bronchial breathing, decreased a breath sounds at BL bases, scattered diffuse wheezing ( has remote h/o 40PPD tobacco smoking), heart exam revealed irregularly irregular , BL legs edema ( the legs are in Unna boots)    Amount and/or Complexity of Data Reviewed  External Data Reviewed: notes.     Details: Patient had cardiac catheterization done at 2/20 04/2022, not able to pull out resolved  Labs: ordered.  Radiology: ordered.  ECG/medicine tests: ordered and independent interpretation performed.     Details: Normal sinus rhythm (HR 76) with occasional PVC, right axis deviation, AV block first-degree, incomplete RBBB, low voltage, Q-wave at III, T flattening in all leads (no previous EKGs available):    Risk  Prescription drug management.  Decision regarding hospitalization.               ED Course as of 09/11/23 2357   Mon Sep 11, 2023   2124 Patient's labs revealed normal CBC (no leukocytosis), normal back, has elevated troponin (0.059), BNP is very  high (2889), CMP revealed hypokalemia with K 3.3 (was supplemented), CXR revealed bilateral infiltrates these right lower lobe suspicion for pneumonia, patient's pulse ox on a ER arrival was 90% on room air with RR 25, pulse ox balance from 90-93% on room air on rest, patient received DuoNeb nebulizer treatment, started to give her IV azithromycin 500 mg and 1 g of IV Rocephin because of pneumonia, patient has history of CHF but there is no previous medical records to compare previous BNP or previous CXR, patient's home medication close furosemide 40 mg twice daily, because of high risk we are going to every repeat 2nd troponin, patient is on Xarelto twice daily so I have very low suspicion for PE [IP]   0215 Patient was accepted by night hospitalist (Dr. Ernandez), patient's gave us was discussed on the phone, discussed blood tests and imaging results [IP]      ED Course User Index  [IP] Pam Duong MD                    Clinical Impression:   Final diagnoses:  [R06.02] SOB (shortness of breath)  [I50.9] Congestive heart failure, unspecified HF chronicity, unspecified heart failure type (Primary)  [J85.1] Abscess of lower lobe of right lung with pneumonia  [J44.1] COPD exacerbation        ED Disposition Condition    Admit Stable                Pam Duong MD  09/12/23 0017

## 2023-09-12 NOTE — NURSING
Left message with wound care answering service for consult regarding BLE venous stasis dermatitis.

## 2023-09-12 NOTE — PROGRESS NOTES
[facility]  [facility]    Patient Name: Chepe Peralta Sr.  MRN: [id number]  Date: 9/12/2023  Diagnosis: COPD exacerbation      Subjective:           Patient ID: Chepe Peralta Sr. is a 73 y.o. male.    Chief Complaint: Shortness of Breath (Pt brought in by [facility] with c/o SOB getting worse today.)      HPI      Past Medical History:     1. Congestive heart failure, unspecified HF chronicity, unspecified heart failure type    2. SOB (shortness of breath)    3. Abscess of lower lobe of right lung with pneumonia    4. COPD exacerbation      Wound Assessment:           Altered Skin Integrity 09/01/23 1204 Right lower Leg #1 (Active)   09/01/23 1204   Altered Skin Integrity Present on Admission - Did Patient arrive to the hospital with altered skin?: yes   Side: Right   Orientation: lower   Location: Leg   Wound Number: #1   Is this injury device related?: No   Primary Wound Type:    Description of Altered Skin Integrity:    Ankle-Brachial Index:    Pulses:    Removal Indication and Assessment:    Wound Outcome:    (Retired) Wound Length (cm):    (Retired) Wound Width (cm):    (Retired) Depth (cm):    Wound Description (Comments):    Removal Indications:    Wound Image    09/12/23 1229   Dressing Appearance Intact 09/12/23 1229   Drainage Amount None 09/12/23 1229   Appearance Pink;Dry 09/12/23 1229   Periwound Area Hemosiderin Staining;Edematous 09/12/23 1229   Wound Length (cm) 0.2 cm 09/12/23 1229   Wound Width (cm) 0.2 cm 09/12/23 1229   Wound Depth (cm) 0.2 cm 09/12/23 1229   Wound Volume (cm^3) 0.008 cm^3 09/12/23 1229   Wound Surface Area (cm^2) 0.04 cm^2 09/12/23 1229   Care Cleansed with: 09/12/23 1229   Dressing Applied 09/12/23 1229   Dressing Change Due 09/15/23 09/12/23 1229            Altered Skin Integrity 09/01/23 1204 Left lower Leg #2 (Active)   09/01/23 1204   Altered Skin Integrity Present on Admission - Did Patient arrive to the hospital with altered skin?:  yes   Side: Left   Orientation: lower   Location: Leg   Wound Number: #2   Is this injury device related?: No   Primary Wound Type:    Description of Altered Skin Integrity:    Ankle-Brachial Index:    Pulses:    Removal Indication and Assessment:    Wound Outcome:    (Retired) Wound Length (cm):    (Retired) Wound Width (cm):    (Retired) Depth (cm):    Wound Description (Comments):    Removal Indications:    Wound Image    09/12/23 1229   Dressing Appearance Intact 09/12/23 1229   Drainage Amount None 09/12/23 1229   Appearance Pink;Dry;Blistered 09/12/23 1229   Periwound Area Hemosiderin Staining;Edematous 09/12/23 1229   Wound Length (cm) 0.2 cm 09/12/23 1229   Wound Width (cm) 0.2 cm 09/12/23 1229   Wound Depth (cm) 0.2 cm 09/12/23 1229   Wound Volume (cm^3) 0.008 cm^3 09/12/23 1229   Wound Surface Area (cm^2) 0.04 cm^2 09/12/23 1229   Care Cleansed with: 09/12/23 1229   Dressing Applied 09/12/23 1229           Plan:       Twice weekly unna boot application, re-evaluation per wound care in 5-7 days      Recommendations:     Bilateral lower legs: cleanse with bath wipe or soap and water, pat dry, apply calamine unna boot, wrap with kerlix and coban, change on Tuesdays and Fridays    Time spent in room:     Teresa Velasquez RN

## 2023-09-12 NOTE — PT/OT/SLP EVAL
Physical Therapy Evaluation    Patient Name:  Chepe Peralta Sr.   MRN:  [id number]    Recommendations:     Discharge Recommendations: home with HH vs SNF    Discharge Equipment Recommendations: RW    Barriers to discharge: None    Assessment:     Chepe Peralta Sr. is a 73 y.o. male admitted with a medical diagnosis of COPD exacerbation.  He presents with the following impairments/functional limitations:   difficulty walking, SOB, weak, BLEs wounds secondary to venous stasis dermatities with unna boots rec by wound care, SOB, CHF, Pneumonia, Lung abcess.    Rehab Prognosis: Good; patient would benefit from acute skilled PT services to address these deficits and reach maximum level of function.    Recent Surgery: * No surgery found *      Plan:     During this hospitalization, patient to be seen   to address the identified rehab impairments via   and progress toward the following goals:    Plan of Care Expires:       Subjective     Chief Complaint: weak  Patient/Family Comments/goals: to go home  Pain/Comfort:       Patients cultural, spiritual, Baptism conflicts given the current situation:      Living Environment:  Home alone in handicap accessible apt in [place]  Prior to admission, patients level of function was I amb.  Equipment used at home:none  .  DME owned (not currently used): none.  Upon discharge, patient will have assistance from family/friends only intermittently.    Objective:     Communicated with pt prior to session.  Patient found HOB elevated with    upon PT entry to room.    General Precautions: Standard,    Orthopedic Precautions:    Braces:    Respiratory Status: Room air    Exams:  RLE Strength: WNL  LLE Strength: WNL    Functional Mobility:  Bed Mobility:     Supine to Sit: minimum assistance  Transfers:     Sit to Stand:  minimum assistance with rolling walker      AM-PAC 6 CLICK MOBILITY  Total Score:12       Treatment & Education:  Amb 30ft RW Paramjit    Patient left up in chair with all  lines intact and call button in reach.    GOALS:   Multidisciplinary Problems       Physical Therapy Goals          Problem: Physical Therapy    Goal Priority Disciplines Outcome Goal Variances Interventions   Physical Therapy Goal     PT, PT/OT Ongoing, Progressing     Description: 1.  Pt will improve bed mob to SBA  2.  Pt will improve tfs to SBA to BSC/chair  3.  Pt will amb 150ft RW SBA  4.  Pt will train HEP BLE AROM ex to maintain strength while inpt                         History:     Past Medical History:   Diagnosis Date    CHF (congestive heart failure)     Chronic venous stasis dermatitis of both lower extremities     Depression     Diabetes mellitus     Gout, unspecified     Hypertension     Paroxysmal atrial fibrillation        Past Surgical History:   Procedure Laterality Date    CORONARY ARTERY BYPASS GRAFT         Time Tracking:     PT Received On: 09/12/23  PT Start Time: 1330     PT Stop Time: 1345  PT Total Time (min): 15 min     Billable Minutes: Evaluation 15      09/12/2023

## 2023-09-12 NOTE — SUBJECTIVE & OBJECTIVE
Interval History:   Review of Systems   Constitutional:  Positive for activity change and fatigue.   HENT: Negative.     Eyes: Negative.    Respiratory:  Positive for cough and shortness of breath.    Cardiovascular: Negative.    Gastrointestinal: Negative.    Endocrine: Negative.    Genitourinary: Negative.    Musculoskeletal: Negative.    Skin: Negative.    Allergic/Immunologic: Negative.    Neurological: Negative.    Hematological: Negative.    Psychiatric/Behavioral: Negative.       Objective:     Vital Signs (Most Recent):  Temp: 98.1 °F (36.7 °C) (09/12/23 1207)  Pulse: 74 (09/12/23 1207)  Resp: 20 (09/12/23 1207)  BP: (!) 161/84 (09/12/23 1207)  SpO2: (!) 91 % (09/12/23 1207) Vital Signs (24h Range):  Temp:  [97.3 °F (36.3 °C)-98.1 °F (36.7 °C)] 98.1 °F (36.7 °C)  Pulse:  [63-88] 74  Resp:  [12-26] 20  SpO2:  [90 %-97 %] 91 %  BP: (155-187)/(64-96) 161/84     Weight: 102.1 kg (225 lb)  Body mass index is 33.23 kg/m².    Intake/Output Summary (Last 24 hours) at 9/12/2023 1258  Last data filed at 9/12/2023 1018  Gross per 24 hour   Intake 720 ml   Output 1125 ml   Net -405 ml         Physical Exam  Constitutional:       Appearance: Normal appearance. He is normal weight.   HENT:      Head: Normocephalic and atraumatic.      Nose: Nose normal.      Mouth/Throat:      Mouth: Mucous membranes are moist.      Pharynx: Oropharynx is clear.   Eyes:      Extraocular Movements: Extraocular movements intact.      Conjunctiva/sclera: Conjunctivae normal.      Pupils: Pupils are equal, round, and reactive to light.   Cardiovascular:      Rate and Rhythm: Normal rate and regular rhythm.      Pulses: Normal pulses.      Heart sounds: Normal heart sounds.   Pulmonary:      Effort: Pulmonary effort is normal.      Breath sounds: Wheezing present.   Abdominal:      General: Bowel sounds are normal.      Palpations: Abdomen is soft.   Musculoskeletal:         General: Normal range of motion.      Cervical back: Normal range of  motion and neck supple.   Skin:     General: Skin is warm and dry.      Capillary Refill: Capillary refill takes 2 to 3 seconds.   Neurological:      General: No focal deficit present.      Mental Status: He is alert and oriented to person, place, and time. Mental status is at baseline.   Psychiatric:         Mood and Affect: Mood normal.         Behavior: Behavior normal.         Thought Content: Thought content normal.         Judgment: Judgment normal.             Significant Labs: All pertinent labs within the past 24 hours have been reviewed.  BMP:   Recent Labs   Lab 09/11/23 2046      K 3.3*   CO2 27   BUN 8.0*   CREATININE 0.86   CALCIUM 9.8   MG 1.90     CBC:   Recent Labs   Lab 09/11/23 2045   WBC 6.28   HGB 11.4*   HCT 35.6*        CMP:   Recent Labs   Lab 09/11/23 2046      K 3.3*   CO2 27   BUN 8.0*   CREATININE 0.86   CALCIUM 9.8   ALBUMIN 3.3*   BILITOT 2.5*   ALKPHOS 123   AST 24   ALT 18     Magnesium:   Recent Labs   Lab 09/11/23 2046   MG 1.90       Significant Imaging: I have reviewed all pertinent imaging results/findings within the past 24 hours.

## 2023-09-12 NOTE — HPI
73 year old male with pmh COPD, CHF, DM II, Venous stasis presenting to ED with complaints of worsening shortness of breath and stated that he has stopped smoking. He was found to have 95% O2 sats on room air at sight by EMS.

## 2023-09-12 NOTE — PROGRESS NOTES
[facility] Medicine  Progress Note    Patient Name: Chepe Peralta Sr.  MRN: [id number]  Patient Class: IP- Inpatient   Admission Date: 9/11/2023  Length of Stay: 0 days  Attending Physician: Sree Romano MD  Primary Care Provider: Payton Lawson FNP-C        Subjective:     Principal Problem:COPD exacerbation        HPI:   73 year old male with pmh COPD, CHF, DM II, Venous stasis presenting to ED with complaints of worsening shortness of breath and stated that he has stopped smoking. He was found to have 95% O2 sats on room air at sight by EMS.         Overview/Hospital Course:  9/12/23-Patient is c/o a cough otherwise he feels a little better today. Will continue with current treatment.      Interval History:   Review of Systems   Constitutional:  Positive for activity change and fatigue.   HENT: Negative.     Eyes: Negative.    Respiratory:  Positive for cough and shortness of breath.    Cardiovascular: Negative.    Gastrointestinal: Negative.    Endocrine: Negative.    Genitourinary: Negative.    Musculoskeletal: Negative.    Skin: Negative.    Allergic/Immunologic: Negative.    Neurological: Negative.    Hematological: Negative.    Psychiatric/Behavioral: Negative.       Objective:     Vital Signs (Most Recent):  Temp: 98.1 °F (36.7 °C) (09/12/23 1207)  Pulse: 74 (09/12/23 1207)  Resp: 20 (09/12/23 1207)  BP: (!) 161/84 (09/12/23 1207)  SpO2: (!) 91 % (09/12/23 1207) Vital Signs (24h Range):  Temp:  [97.3 °F (36.3 °C)-98.1 °F (36.7 °C)] 98.1 °F (36.7 °C)  Pulse:  [63-88] 74  Resp:  [12-26] 20  SpO2:  [90 %-97 %] 91 %  BP: (155-187)/(64-96) 161/84     Weight: 102.1 kg (225 lb)  Body mass index is 33.23 kg/m².    Intake/Output Summary (Last 24 hours) at 9/12/2023 1258  Last data filed at 9/12/2023 1018  Gross per 24 hour   Intake 720 ml   Output 1125 ml   Net -405 ml         Physical Exam  Constitutional:       Appearance: Normal appearance. He is normal weight.    HENT:      Head: Normocephalic and atraumatic.      Nose: Nose normal.      Mouth/Throat:      Mouth: Mucous membranes are moist.      Pharynx: Oropharynx is clear.   Eyes:      Extraocular Movements: Extraocular movements intact.      Conjunctiva/sclera: Conjunctivae normal.      Pupils: Pupils are equal, round, and reactive to light.   Cardiovascular:      Rate and Rhythm: Normal rate and regular rhythm.      Pulses: Normal pulses.      Heart sounds: Normal heart sounds.   Pulmonary:      Effort: Pulmonary effort is normal.      Breath sounds: Wheezing present.   Abdominal:      General: Bowel sounds are normal.      Palpations: Abdomen is soft.   Musculoskeletal:         General: Normal range of motion.      Cervical back: Normal range of motion and neck supple.   Skin:     General: Skin is warm and dry.      Capillary Refill: Capillary refill takes 2 to 3 seconds.   Neurological:      General: No focal deficit present.      Mental Status: He is alert and oriented to person, place, and time. Mental status is at baseline.   Psychiatric:         Mood and Affect: Mood normal.         Behavior: Behavior normal.         Thought Content: Thought content normal.         Judgment: Judgment normal.             Significant Labs: All pertinent labs within the past 24 hours have been reviewed.  BMP:   Recent Labs   Lab 09/11/23 2046      K 3.3*   CO2 27   BUN 8.0*   CREATININE 0.86   CALCIUM 9.8   MG 1.90     CBC:   Recent Labs   Lab 09/11/23 2045   WBC 6.28   HGB 11.4*   HCT 35.6*        CMP:   Recent Labs   Lab 09/11/23 2046      K 3.3*   CO2 27   BUN 8.0*   CREATININE 0.86   CALCIUM 9.8   ALBUMIN 3.3*   BILITOT 2.5*   ALKPHOS 123   AST 24   ALT 18     Magnesium:   Recent Labs   Lab 09/11/23 2046   MG 1.90       Significant Imaging: I have reviewed all pertinent imaging results/findings within the past 24 hours.      Assessment/Plan:      * COPD exacerbation    Nebs  Follow labs  Wean O2 if  possible  IV antibiotics  Add steroids  therapy    Hypertension  monitor      Atrial fibrillation  Patient with Long standing persistent (>12 months) atrial fibrillation which is controlled currently with Beta Blocker. Patient is currently in sinus rhythm.[id number] Score: 3. HASBLED Score: .. Anticoagulation indicated. Anticoagulation done with xarelto.      VTE Risk Mitigation (From admission, onward)         Ordered     rivaroxaban tablet 20 mg  With dinner         09/12/23 0153     IP VTE HIGH RISK PATIENT  Once         09/12/23 0000     Place sequential compression device  Until discontinued         09/12/23 0000                Discharge Planning   JULIO CÉSAR:      Code Status: Full Code   Is the patient medically ready for discharge?:     Reason for patient still in hospital (select all that apply): Patient trending condition, Laboratory test, Treatment, Imaging, PT / OT recommendations and Pending disposition                     Klever Tillman MD  Department of Hospital Medicine   [facility]

## 2023-09-12 NOTE — H&P
[facility] - Emergency Dept    History & Physical      Patient Name: Chepe Peralta Sr.  MRN: [id number]  Admission Date: 9/11/2023  Attending Physician: Lindy Ernandez MD  Primary Care Provider: Payton Lawson FNP-C         Patient information was obtained from patient and ER records.     Subjective:     Principal Problem:COPD exacerbation    Chief Complaint:   Chief Complaint   Patient presents with    Shortness of Breath     Pt brought in by [facility] with c/o SOB getting worse today.        HPI: 73 year old male with pmh COPD, CHF, DM II, Venous stasis presenting to ED with complaints of worsening shortness of breath and stated that he has stopped smoking. He was found to have 95% O2 sats on room air at sight by EMS.    Past Medical History:   Diagnosis Date    CHF (congestive heart failure)     Chronic venous stasis dermatitis of both lower extremities     Depression     Diabetes mellitus     Gout, unspecified     Hypertension     Paroxysmal atrial fibrillation        Past Surgical History:   Procedure Laterality Date    CORONARY ARTERY BYPASS GRAFT         Review of patient's allergies indicates:  No Known Allergies    No current facility-administered medications on file prior to encounter.     Current Outpatient Medications on File Prior to Encounter   Medication Sig    allopurinoL (ZYLOPRIM) 300 MG tablet Take 300 mg by mouth.    atorvastatin (LIPITOR) 80 MG tablet Take 80 mg by mouth.    carvediloL (COREG) 25 MG tablet Take 25 mg by mouth 2 (two) times daily.    felodipine (PLENDIL) 5 MG 24 hr tablet Take 5 mg by mouth.    furosemide (LASIX) 40 MG tablet Take 40 mg by mouth 2 (two) times daily.    rivaroxaban (XARELTO) 20 mg Tab Take 20 mg by mouth Daily.    sertraline (ZOLOFT) 100 MG tablet Take 100 mg by mouth.    valsartan (DIOVAN) 40 MG tablet Take 40 mg by mouth.    glimepiride (AMARYL) 4 MG tablet Take 4 mg by mouth.    tamsulosin (FLOMAX) 0.4 mg Cap Take 1 capsule by mouth.     Family  History       Problem Relation (Age of Onset)    Diabetes Mother    Heart disease Father          Tobacco Use    Smoking status: Never    Smokeless tobacco: Current   Substance and Sexual Activity    Alcohol use: Never    Drug use: Never    Sexual activity: Not on file     Review of Systems   Constitutional:  Positive for fatigue.   HENT: Negative.     Eyes: Negative.    Respiratory:  Positive for chest tightness and shortness of breath.    Cardiovascular: Negative.    Gastrointestinal: Negative.    Endocrine: Negative.    Genitourinary: Negative.    Musculoskeletal: Negative.    Skin: Negative.    Allergic/Immunologic: Negative.    Neurological:  Positive for weakness.   Hematological: Negative.    Psychiatric/Behavioral: Negative.       Objective:     Vital Signs (Most Recent):  Temp: 97.7 °F (36.5 °C) (09/12/23 0428)  Pulse: 79 (09/12/23 0428)  Resp: (!) 22 (09/12/23 0124)  BP: (!) 155/71 (09/12/23 0428)  SpO2: 95 % (09/12/23 0428) Vital Signs (24h Range):  Temp:  [97.3 °F (36.3 °C)-97.7 °F (36.5 °C)] 97.7 °F (36.5 °C)  Pulse:  [63-88] 79  Resp:  [12-26] 22  SpO2:  [90 %-97 %] 95 %  BP: (155-187)/(64-96) 155/71     Weight: 102.1 kg (225 lb)  Body mass index is 33.23 kg/m².    Physical Exam  Constitutional:       Appearance: Normal appearance.   HENT:      Head: Normocephalic and atraumatic.      Nose: Nose normal.      Mouth/Throat:      Mouth: Mucous membranes are moist.   Eyes:      Extraocular Movements: Extraocular movements intact.      Pupils: Pupils are equal, round, and reactive to light.   Cardiovascular:      Rate and Rhythm: Normal rate and regular rhythm.      Pulses: Normal pulses.      Heart sounds: Normal heart sounds.   Pulmonary:      Effort: Respiratory distress present.      Breath sounds: Rhonchi present.   Abdominal:      Palpations: Abdomen is soft.   Musculoskeletal:         General: Normal range of motion.   Skin:     General: Skin is warm and dry.      Capillary Refill: Capillary refill  takes less than 2 seconds.   Neurological:      General: No focal deficit present.      Mental Status: He is alert. Mental status is at baseline.   Psychiatric:         Behavior: Behavior normal.           CRANIAL NERVES     CN III, IV, VI   Pupils are equal, round, and reactive to light.      Significant Labs: All pertinent labs within the past 24 hours have been reviewed.  Recent Lab Results  (Last 5 results in the past 24 hours)        09/12/23  0602   09/12/23  0415   09/11/23  2303   09/11/23  2046   09/11/23  2045        Albumin/Globulin Ratio       0.8         Albumin       3.3         Alkaline Phosphatase       123         ALT       18         aPTT         44.6  Comment: For Minimal Heparin Infusion, the goal aPTT 64-85 seconds corresponds to an anti-Xa of 0.3-0.5.    For Low Intensity and High Intensity Heparin, the goal aPTT  seconds corresponds to an anti-Xa of 0.3-0.7       AST       24         Baso #         0.04       Basophil %         0.6       BILIRUBIN TOTAL       2.5         BNP         2,889.1       BUN       8.0         Calcium       9.8         Chloride       103         CO2       27         Creatinine       0.86         eGFR       >60         Eos #         0.21       Eosinophil %         3.3       Estimated Avg Glucose   139.9             Globulin, Total       4.0         Glucose       169         Hematocrit         35.6       Hemoglobin         11.4       Hemoglobin A1C External   6.5             Immature Grans (Abs)         0.02       Immature Granulocytes         0.3       INR         1.4       Lactate, Jorge         1.2       Lymph #         1.11       LYMPH %         17.7       Magnesium        1.90         MCH         30.1       MCHC         32.0       MCV         93.9       Mono #         0.64       Mono %         10.2       MPV         10.4       Neut #         4.26       Neut %         67.9       Platelets         193       POCT Glucose 153               Potassium       3.3          PROTEIN TOTAL       7.3         Protime         17.7       RBC         3.79       RDW         18.8       Sodium       140         Thyroid Stimulating Hormone       1.210         Troponin I     0.055     0.059       WBC         6.28                              Significant Imaging: I have reviewed all pertinent imaging results/findings within the past 24 hours.    Assessment/Plan:     Active Diagnoses:    Diagnosis Date Noted POA    PRINCIPAL PROBLEM:  COPD exacerbation [J44.1] 09/12/2023 Unknown    SOB (shortness of breath) [R06.02] 09/12/2023 Unknown    Congestive heart failure [I50.9] 09/12/2023 Unknown    Stasis dermatitis of both legs [I87.2] 06/16/2023 Yes    Diabetes mellitus [E11.9] 06/11/2023 Yes    Hypertension [I10] 06/11/2023 Yes    Atrial fibrillation [I48.91] 06/11/2023 Yes      Problems Resolved During this Admission:     VTE Risk Mitigation (From admission, onward)           Ordered     rivaroxaban tablet 20 mg  With dinner         09/12/23 0153     IP VTE HIGH RISK PATIENT  Once         09/12/23 0000     Place sequential compression device  Until discontinued         09/12/23 0000                  COPD Exacerbation:  Presenting with respiratory distress and cough  Received nebulized treatment  will admit and cont management  Empiric abx therapy prn  Supplemental oxygen prn      CHF exacerbation:  Acute on chronic  With associated dyspnea, chest tightness, and significant BLE edema  BNP 2,889  Diuretic therapy (Furosemide BID)  Daily weights, I/Os  Resume beta blocker     Hypokalemia:  Replete prn likely due to diuretics use    HTN:  Resume home meds once reconciled  Give IV antihypertensive for prn use with parameters     DM II:  Resume home meds as necessary  Sliding scale Insulin  Accu cheks  ACHS  Hypoglycemic protocol    Paroxysmal a. Fib  Place on tele  Resumed BB  Electrolyte replacement prn  Resumed Xarelto    Chronic Venous Stasis:  Pt presents in Bilateral Rupali boots    The patient is  expected to have a LOS less than 2 midnights and will be admitted to OBSERVATION status.             Service was provided using HIPAA compliant web platform using SOC for audio/visual equipment.  Patient location: Hospital  Provider Location: [place], [place]  Participants on call: Bedside RN, Patient  Consent was obtained and the patient was seen with nurse assiting from the bedside.      Lindy Ernandez MD  Department of Hospital Medicine   [facility] - Emergency Dept

## 2023-09-12 NOTE — ASSESSMENT & PLAN NOTE
Patient with Long standing persistent (>12 months) atrial fibrillation which is controlled currently with Beta Blocker. Patient is currently in sinus rhythm.[id number] Score: 3. HASBLED Score: .. Anticoagulation indicated. Anticoagulation done with xarelto.

## 2023-09-12 NOTE — HOSPITAL COURSE
9/12/23-Patient is c/o a cough otherwise he feels a little better today. Will continue with current treatment.    9/13/23-Patient is feeling better today.  Breathing is improving.  Will continue treatment.    9/14/23: sob improved. Satting well on RA. Labs unremarkable. Deemed stable to be d/c

## 2023-09-13 LAB
ALBUMIN SERPL-MCNC: 3 G/DL (ref 3.4–4.8)
ALBUMIN/GLOB SERPL: 0.8 RATIO (ref 1.1–2)
ALP SERPL-CCNC: 113 UNIT/L (ref 40–150)
ALT SERPL-CCNC: 16 UNIT/L (ref 0–55)
AST SERPL-CCNC: 21 UNIT/L (ref 5–34)
BASOPHILS # BLD AUTO: 0.02 X10(3)/MCL
BASOPHILS NFR BLD AUTO: 0.3 %
BILIRUB SERPL-MCNC: 1.8 MG/DL
BNP BLD-MCNC: 2368.9 PG/ML
BUN SERPL-MCNC: 11 MG/DL (ref 8.4–25.7)
CALCIUM SERPL-MCNC: 8.8 MG/DL (ref 8.8–10)
CHLORIDE SERPL-SCNC: 101 MMOL/L (ref 98–107)
CO2 SERPL-SCNC: 30 MMOL/L (ref 23–31)
CREAT SERPL-MCNC: 0.97 MG/DL (ref 0.73–1.18)
EOSINOPHIL # BLD AUTO: 0.17 X10(3)/MCL (ref 0–0.9)
EOSINOPHIL NFR BLD AUTO: 2.6 %
ERYTHROCYTE [DISTWIDTH] IN BLOOD BY AUTOMATED COUNT: 18.6 % (ref 11.5–17)
GFR SERPLBLD CREATININE-BSD FMLA CKD-EPI: >60 MLS/MIN/1.73/M2
GLOBULIN SER-MCNC: 3.7 GM/DL (ref 2.4–3.5)
GLUCOSE SERPL-MCNC: 143 MG/DL (ref 82–115)
HCT VFR BLD AUTO: 31.8 % (ref 42–52)
HGB BLD-MCNC: 10.1 G/DL (ref 14–18)
IMM GRANULOCYTES # BLD AUTO: 0.02 X10(3)/MCL (ref 0–0.04)
IMM GRANULOCYTES NFR BLD AUTO: 0.3 %
LYMPHOCYTES # BLD AUTO: 1.11 X10(3)/MCL (ref 0.6–4.6)
LYMPHOCYTES NFR BLD AUTO: 16.7 %
MAGNESIUM SERPL-MCNC: 1.9 MG/DL (ref 1.6–2.6)
MCH RBC QN AUTO: 29.9 PG (ref 27–31)
MCHC RBC AUTO-ENTMCNC: 31.8 G/DL (ref 33–36)
MCV RBC AUTO: 94.1 FL (ref 80–94)
MONOCYTES # BLD AUTO: 0.64 X10(3)/MCL (ref 0.1–1.3)
MONOCYTES NFR BLD AUTO: 9.7 %
NEUTROPHILS # BLD AUTO: 4.67 X10(3)/MCL (ref 2.1–9.2)
NEUTROPHILS NFR BLD AUTO: 70.4 %
PLATELET # BLD AUTO: 169 X10(3)/MCL (ref 130–400)
PMV BLD AUTO: 11.3 FL (ref 7.4–10.4)
POCT GLUCOSE: 151 MG/DL (ref 70–110)
POCT GLUCOSE: 167 MG/DL (ref 70–110)
POCT GLUCOSE: 167 MG/DL (ref 70–110)
POTASSIUM SERPL-SCNC: 3.4 MMOL/L (ref 3.5–5.1)
PROT SERPL-MCNC: 6.7 GM/DL (ref 5.8–7.6)
RBC # BLD AUTO: 3.38 X10(6)/MCL (ref 4.7–6.1)
SODIUM SERPL-SCNC: 140 MMOL/L (ref 136–145)
WBC # SPEC AUTO: 6.63 X10(3)/MCL (ref 4.5–11.5)

## 2023-09-13 PROCEDURE — 27000221 HC OXYGEN, UP TO 24 HOURS

## 2023-09-13 PROCEDURE — 63600175 PHARM REV CODE 636 W HCPCS: Performed by: INTERNAL MEDICINE

## 2023-09-13 PROCEDURE — 25000003 PHARM REV CODE 250: Performed by: INTERNAL MEDICINE

## 2023-09-13 PROCEDURE — 99900035 HC TECH TIME PER 15 MIN (STAT)

## 2023-09-13 PROCEDURE — 97116 GAIT TRAINING THERAPY: CPT | Mod: CQ

## 2023-09-13 PROCEDURE — 25000242 PHARM REV CODE 250 ALT 637 W/ HCPCS: Performed by: INTERNAL MEDICINE

## 2023-09-13 PROCEDURE — 80053 COMPREHEN METABOLIC PANEL: CPT | Performed by: INTERNAL MEDICINE

## 2023-09-13 PROCEDURE — 85025 COMPLETE CBC W/AUTO DIFF WBC: CPT | Performed by: INTERNAL MEDICINE

## 2023-09-13 PROCEDURE — 83880 ASSAY OF NATRIURETIC PEPTIDE: CPT | Performed by: INTERNAL MEDICINE

## 2023-09-13 PROCEDURE — 94640 AIRWAY INHALATION TREATMENT: CPT

## 2023-09-13 PROCEDURE — 97530 THERAPEUTIC ACTIVITIES: CPT | Mod: CQ

## 2023-09-13 PROCEDURE — 21400001 HC TELEMETRY ROOM

## 2023-09-13 PROCEDURE — 94761 N-INVAS EAR/PLS OXIMETRY MLT: CPT

## 2023-09-13 PROCEDURE — 83735 ASSAY OF MAGNESIUM: CPT | Performed by: INTERNAL MEDICINE

## 2023-09-13 RX ADMIN — IPRATROPIUM BROMIDE AND ALBUTEROL SULFATE 3 ML: 2.5; .5 SOLUTION RESPIRATORY (INHALATION) at 03:09

## 2023-09-13 RX ADMIN — AZITHROMYCIN MONOHYDRATE 500 MG: 500 INJECTION, POWDER, LYOPHILIZED, FOR SOLUTION INTRAVENOUS at 10:09

## 2023-09-13 RX ADMIN — FUROSEMIDE 40 MG: 40 TABLET ORAL at 09:09

## 2023-09-13 RX ADMIN — IPRATROPIUM BROMIDE AND ALBUTEROL SULFATE 3 ML: 2.5; .5 SOLUTION RESPIRATORY (INHALATION) at 07:09

## 2023-09-13 RX ADMIN — CEFTRIAXONE SODIUM 1 G: 1 INJECTION, POWDER, FOR SOLUTION INTRAMUSCULAR; INTRAVENOUS at 10:09

## 2023-09-13 RX ADMIN — ATORVASTATIN CALCIUM 80 MG: 40 TABLET, FILM COATED ORAL at 09:09

## 2023-09-13 RX ADMIN — IPRATROPIUM BROMIDE AND ALBUTEROL SULFATE 3 ML: 2.5; .5 SOLUTION RESPIRATORY (INHALATION) at 11:09

## 2023-09-13 RX ADMIN — RIVAROXABAN 20 MG: 10 TABLET, FILM COATED ORAL at 04:09

## 2023-09-13 RX ADMIN — CARVEDILOL 25 MG: 25 TABLET, FILM COATED ORAL at 09:09

## 2023-09-13 RX ADMIN — CARVEDILOL 25 MG: 25 TABLET, FILM COATED ORAL at 10:09

## 2023-09-13 RX ADMIN — TAMSULOSIN HYDROCHLORIDE 0.4 MG: 0.4 CAPSULE ORAL at 09:09

## 2023-09-13 RX ADMIN — FUROSEMIDE 40 MG: 40 TABLET ORAL at 04:09

## 2023-09-13 NOTE — PLAN OF CARE
Problem: Adult Inpatient Plan of Care  Goal: Plan of Care Review  Outcome: Ongoing, Progressing  Goal: Patient-Specific Goal (Individualized)  Outcome: Ongoing, Progressing  Goal: Absence of Hospital-Acquired Illness or Injury  Outcome: Ongoing, Progressing  Goal: Optimal Comfort and Wellbeing  Outcome: Ongoing, Progressing  Goal: Readiness for Transition of Care  Outcome: Ongoing, Progressing     Problem: Diabetes Comorbidity  Goal: Blood Glucose Level Within Targeted Range  Outcome: Ongoing, Progressing     Problem: Impaired Wound Healing  Goal: Optimal Wound Healing  Outcome: Ongoing, Progressing     Problem: COPD (Chronic Obstructive Pulmonary Disease) Comorbidity  Goal: Maintenance of COPD Symptom Control  Outcome: Ongoing, Progressing     Problem: Heart Failure Comorbidity  Goal: Maintenance of Heart Failure Symptom Control  Outcome: Ongoing, Progressing     Problem: Hypertension Comorbidity  Goal: Blood Pressure in Desired Range  Outcome: Ongoing, Progressing     Problem: Cardiac Output Decreased (Heart Failure)  Goal: Optimal Cardiac Output  Outcome: Ongoing, Progressing     Problem: Dysrhythmia (Heart Failure)  Goal: Stable Heart Rate and Rhythm  Outcome: Ongoing, Progressing     Problem: Fluid Imbalance (Heart Failure)  Goal: Fluid Balance  Outcome: Ongoing, Progressing     Problem: Functional Ability Impaired (Heart Failure)  Goal: Optimal Functional Ability  Outcome: Ongoing, Progressing     Problem: Oral Intake Inadequate (Heart Failure)  Goal: Optimal Nutrition Intake  Outcome: Ongoing, Progressing     Problem: Respiratory Compromise (Heart Failure)  Goal: Effective Oxygenation and Ventilation  Outcome: Ongoing, Progressing     Problem: Gas Exchange Impaired  Goal: Optimal Gas Exchange  Outcome: Ongoing, Progressing     Problem: Skin Injury Risk Increased  Goal: Skin Health and Integrity  Outcome: Ongoing, Progressing     Problem: Fall Injury Risk  Goal: Absence of Fall and Fall-Related  Injury  Outcome: Ongoing, Progressing     Problem: Bariatric Environmental Safety  Goal: Safety Maintained with Care  Outcome: Ongoing, Progressing

## 2023-09-13 NOTE — PROGRESS NOTES
"Inpatient Nutrition Evaluation    Admit Date: 9/11/2023   Total duration of encounter: 2 days    Nutrition Recommendation/Prescription     Rec'd Diabetic/Low Na+ Diet as tolerated.   Daily weights.   Monitor daily weight,  I&O's, and labs.       RD following and available as needed. Thank you.       Nutrition Assessment     Chart Review    Reason Seen: continuous nutrition monitoring    Malnutrition Screening Tool Results   Have you recently lost weight without trying?: No  Have you been eating poorly because of a decreased appetite?: No   MST Score: 0     Diagnosis:  SOB (shortness of breath)  Congestive heart failure, unspecified HF chronicity, unspecified heart failure type (Primary)  Abscess of lower lobe of right lung with pneumonia  COPD exacerbation    Relevant Medical History:   CHF (congestive heart failure)      Chronic venous stasis dermatitis of both lower extremities      Depression      Diabetes mellitus      Gout, unspecified      Hypertension      Paroxysmal atrial fibrillation          Nutrition-Related Medications:   Insulin; Lasix; Zithromax.     Nutrition-Related Labs:  9/13: H/H 10.1/31.8(L); K+ 3.4(L); (H); Alb 3.0(L); Bili 1.8(H).  9/12: HgbA1C 6.5.    Diet Order: Diet diabetic  Oral Supplement Order: none  Appetite/Oral Intake: good/% of meals  Factors Affecting Nutritional Intake: none identified  Food/Catholic/Cultural Preferences: none reported  Food Allergies: none reported    Skin Integrity: other (see comments) (juma boot intact)  Wound(s):       Comments    9/13: Pt with 100% recorded intake. Noted ~ 35# weight gain from UBW per admit weight. Last charted weight from today in per EMR likely error, indicating 297# weight gain x 2 days. No recent weight loss noted/reported. Noted bilateral chronic venous stasis dermatitis to lower extremities. Labs and meds reviewed. Will continue to monitor during stay.     Anthropometrics    Height: 5' 9" (175.3 cm) Height Method: " Stated  Last Weight: (!) 237 kg (522 lb 7.8 oz) (23 0651)    BMI (Calculated): 77.1  BMI Classification: obese grade II (BMI 35-39.9)        Ideal Body Weight (IBW), Male: 160 lb     % Ideal Body Weight, Male (lb): 140.63 %                 Usual Body Weight (UBW), k.2 kg  % Usual Body Weight: 275.52     Usual Weight Provided By: EMR weight history    Wt Readings from Last 5 Encounters:   23 (!) 237 kg (522 lb 7.8 oz)   23 86.2 kg (190 lb)   23 86.2 kg (190 lb)   23 86.2 kg (190 lb)   23 86.2 kg (190 lb)     Weight Change(s) Since Admission:  Admit Weight: 102.1 kg (225 lb) (23)  : Question charted weight; which indicates 297 lb weight gain x 2 days.     *Noted 35# weight gain from UBW (190#) per admit weight of 225#    Patient Education    Not applicable.    Monitoring & Evaluation     Dietitian will monitor food and beverage intake, energy intake, weight, weight change, electrolyte/renal panel, glucose/endocrine profile, and gastrointestinal profile.  Nutrition Risk/Follow-Up: low (follow-up in 5-7 days)  Patients assigned 'low nutrition risk' status do not qualify for a full nutritional assessment but will be monitored and re-evaluated in a 5-7 day time period. Please consult if re-evaluation needed sooner.

## 2023-09-13 NOTE — PLAN OF CARE
Problem: Adult Inpatient Plan of Care  Goal: Plan of Care Review  Outcome: Ongoing, Progressing  Goal: Patient-Specific Goal (Individualized)  Outcome: Ongoing, Progressing  Goal: Absence of Hospital-Acquired Illness or Injury  Outcome: Ongoing, Progressing  Goal: Optimal Comfort and Wellbeing  Outcome: Ongoing, Progressing  Goal: Readiness for Transition of Care  Outcome: Ongoing, Progressing     Problem: Diabetes Comorbidity  Goal: Blood Glucose Level Within Targeted Range  Outcome: Ongoing, Progressing     Problem: Impaired Wound Healing  Goal: Optimal Wound Healing  Outcome: Ongoing, Progressing     Problem: COPD (Chronic Obstructive Pulmonary Disease) Comorbidity  Goal: Maintenance of COPD Symptom Control  Outcome: Ongoing, Progressing     Problem: Heart Failure Comorbidity  Goal: Maintenance of Heart Failure Symptom Control  Outcome: Ongoing, Progressing     Problem: Hypertension Comorbidity  Goal: Blood Pressure in Desired Range  Outcome: Ongoing, Progressing     Problem: Cardiac Output Decreased (Heart Failure)  Goal: Optimal Cardiac Output  Outcome: Ongoing, Progressing     Problem: Dysrhythmia (Heart Failure)  Goal: Stable Heart Rate and Rhythm  Outcome: Ongoing, Progressing     Problem: Fluid Imbalance (Heart Failure)  Goal: Fluid Balance  Outcome: Ongoing, Progressing     Problem: Functional Ability Impaired (Heart Failure)  Goal: Optimal Functional Ability  Outcome: Ongoing, Progressing     Problem: Oral Intake Inadequate (Heart Failure)  Goal: Optimal Nutrition Intake  Outcome: Ongoing, Progressing     Problem: Respiratory Compromise (Heart Failure)  Goal: Effective Oxygenation and Ventilation  Outcome: Ongoing, Progressing     Problem: Gas Exchange Impaired  Goal: Optimal Gas Exchange  Outcome: Ongoing, Progressing

## 2023-09-13 NOTE — PROGRESS NOTES
[facility] - Medical Surgical [facility] Medicine  Progress Note    Patient Name: Chepe Peralta Sr.  MRN: [id number]  Patient Class: IP- Inpatient   Admission Date: 9/11/2023  Length of Stay: 1 days  Attending Physician: Sree Romano MD  Primary Care Provider: Payton Lawson FNP-C        Subjective:     Principal Problem:COPD exacerbation        HPI:   73 year old male with pmh COPD, CHF, DM II, Venous stasis presenting to ED with complaints of worsening shortness of breath and stated that he has stopped smoking. He was found to have 95% O2 sats on room air at sight by EMS.         Overview/Hospital Course:  9/12/23-Patient is c/o a cough otherwise he feels a little better today. Will continue with current treatment.    9/13/23-Patient is feeling better today.  Breathing is improving.  Will continue treatment.      Interval History:     Review of Systems   Constitutional:  Positive for activity change and fatigue.   HENT: Negative.     Eyes: Negative.    Respiratory:  Positive for cough and shortness of breath.    Cardiovascular: Negative.    Gastrointestinal: Negative.    Endocrine: Negative.    Genitourinary: Negative.    Musculoskeletal:  Positive for gait problem.   Skin: Negative.    Allergic/Immunologic: Negative.    Neurological:  Positive for weakness.   Hematological: Negative.    Psychiatric/Behavioral: Negative.       Objective:     Vital Signs (Most Recent):  Temp: 97.5 °F (36.4 °C) (09/13/23 1245)  Pulse: 69 (09/13/23 1245)  Resp: (!) 22 (09/13/23 1245)  BP: (!) 149/76 (09/13/23 1245)  SpO2: (!) 92 % (09/13/23 1245) Vital Signs (24h Range):  Temp:  [97.4 °F (36.3 °C)-98.5 °F (36.9 °C)] 97.5 °F (36.4 °C)  Pulse:  [54-72] 69  Resp:  [18-22] 22  SpO2:  [91 %-99 %] 92 %  BP: (129-157)/(64-80) 149/76     Weight: (!) 237 kg (522 lb 7.8 oz)  Body mass index is 77.16 kg/m².    Intake/Output Summary (Last 24 hours) at 9/13/2023 1435  Last data filed at 9/13/2023 1200  Gross per 24 hour    Intake 1248.37 ml   Output 325 ml   Net 923.37 ml         Physical Exam  Constitutional:       Appearance: Normal appearance. He is obese.   HENT:      Head: Normocephalic and atraumatic.      Nose: Nose normal.      Mouth/Throat:      Mouth: Mucous membranes are moist.      Pharynx: Oropharynx is clear.   Eyes:      Extraocular Movements: Extraocular movements intact.      Conjunctiva/sclera: Conjunctivae normal.      Pupils: Pupils are equal, round, and reactive to light.   Cardiovascular:      Rate and Rhythm: Normal rate and regular rhythm.      Pulses: Normal pulses.      Heart sounds: Normal heart sounds.   Pulmonary:      Effort: Pulmonary effort is normal.      Breath sounds: Rales present.   Abdominal:      General: Bowel sounds are normal.      Palpations: Abdomen is soft.   Musculoskeletal:         General: Normal range of motion.      Cervical back: Normal range of motion and neck supple.   Skin:     General: Skin is warm and dry.      Capillary Refill: Capillary refill takes 2 to 3 seconds.   Neurological:      General: No focal deficit present.      Mental Status: He is alert and oriented to person, place, and time. Mental status is at baseline.   Psychiatric:         Mood and Affect: Mood normal.         Behavior: Behavior normal.         Thought Content: Thought content normal.         Judgment: Judgment normal.             Significant Labs: All pertinent labs within the past 24 hours have been reviewed.  BMP:   Recent Labs   Lab 09/13/23 0437      K 3.4*   CO2 30   BUN 11.0   CREATININE 0.97   CALCIUM 8.8   MG 1.90     CBC:   Recent Labs   Lab 09/11/23 2045 09/13/23 0437   WBC 6.28 6.63   HGB 11.4* 10.1*   HCT 35.6* 31.8*    169     CMP:   Recent Labs   Lab 09/11/23 2046 09/13/23  0437    140   K 3.3* 3.4*   CO2 27 30   BUN 8.0* 11.0   CREATININE 0.86 0.97   CALCIUM 9.8 8.8   ALBUMIN 3.3* 3.0*   BILITOT 2.5* 1.8*   ALKPHOS 123 113   AST 24 21   ALT 18 16     Magnesium:    Recent Labs   Lab 09/11/23 2046 09/13/23  0437   MG 1.90 1.90       Significant Imaging: I have reviewed all pertinent imaging results/findings within the past 24 hours.      Assessment/Plan:      * COPD exacerbation    Nebs  Follow labs  Wean O2 if possible  IV antibiotics  Add steroids  therapy    Hypertension  monitor      Atrial fibrillation  Patient with Long standing persistent (>12 months) atrial fibrillation which is controlled currently with Beta Blocker. Patient is currently in sinus rhythm.[id number] Score: 3. HASBLED Score: .. Anticoagulation indicated. Anticoagulation done with xarelto.      VTE Risk Mitigation (From admission, onward)         Ordered     rivaroxaban tablet 20 mg  With dinner         09/12/23 0153     IP VTE HIGH RISK PATIENT  Once         09/12/23 0000     Place sequential compression device  Until discontinued         09/12/23 0000              Follow labs  DVT prophylaxis  OOB  therapy  Discharge Planning   JULIO CÉSAR:      Code Status: Full Code   Is the patient medically ready for discharge?:     Reason for patient still in hospital (select all that apply): Patient trending condition, Laboratory test, Treatment and PT / OT recommendations                     Klever Tillman MD  Department of Hospital Medicine   [facility] - Medical Surgical Unit

## 2023-09-13 NOTE — PT/OT/SLP PROGRESS
Physical Therapy Treatment    Patient Name:  Chepe Peralta Sr.   MRN:  [id number]    Recommendations:     Discharge Recommendations:    Discharge Equipment Recommendations:    Barriers to discharge: None    Assessment:     Chepe Peralta Sr. is a 73 y.o. male admitted with a medical diagnosis of COPD exacerbation.  He presents with the following impairments/functional limitations: weakness, impaired endurance, gait instability, impaired balance, pain, decreased safety awareness.    Rehab Prognosis: Good; patient would benefit from acute skilled PT services to address these deficits and reach maximum level of function.    Recent Surgery: * No surgery found *      Plan:     During this hospitalization, patient to be seen   to address the identified rehab impairments via gait training, therapeutic activities, therapeutic exercises and progress toward the following goals:    Plan of Care Expires:       Subjective     Chief Complaint: weakness, fatigue with exertion  Patient/Family Comments/goals: to gain strength  Pain/Comfort:         Objective:     Communicated with patient prior to session.  Patient found up in chair with   upon PT entry to room.     General Precautions: Standard,    Orthopedic Precautions:    Braces:    Respiratory Status: Room air     Functional Mobility:  Transfers:     Sit to Stand:  minimum assistance with rolling walker  Gait: 140' with RW and CGA  Balance: min-CGA in supported standing      AM-PAC 6 CLICK MOBILITY          Treatment & Education:  See mobility above, reviewed general exercises    Patient left up in chair with all lines intact and call button in reach..    GOALS:   Multidisciplinary Problems       Physical Therapy Goals          Problem: Physical Therapy    Goal Priority Disciplines Outcome Goal Variances Interventions   Physical Therapy Goal     PT, PT/OT Ongoing, Progressing     Description: 1.  Pt will improve bed mob to SBA  2.  Pt will improve tfs to SBA to BSC/chair  3.   Pt will amb 150ft RW SBA  4.  Pt will train HEP BLE AROM ex to maintain strength while inpt                         Time Tracking:     PT Received On: 09/13/23  PT Start Time: 1505    PT Stop Time: 1535  PT Total Time (min): 30 min     Billable Minutes: Gait Training 15 and Therapeutic Activity 15    Treatment Type: Treatment  PT/PTA: PTA           09/13/2023

## 2023-09-13 NOTE — SUBJECTIVE & OBJECTIVE
Interval History:     Review of Systems   Constitutional:  Positive for activity change and fatigue.   HENT: Negative.     Eyes: Negative.    Respiratory:  Positive for cough and shortness of breath.    Cardiovascular: Negative.    Gastrointestinal: Negative.    Endocrine: Negative.    Genitourinary: Negative.    Musculoskeletal:  Positive for gait problem.   Skin: Negative.    Allergic/Immunologic: Negative.    Neurological:  Positive for weakness.   Hematological: Negative.    Psychiatric/Behavioral: Negative.       Objective:     Vital Signs (Most Recent):  Temp: 97.5 °F (36.4 °C) (09/13/23 1245)  Pulse: 69 (09/13/23 1245)  Resp: (!) 22 (09/13/23 1245)  BP: (!) 149/76 (09/13/23 1245)  SpO2: (!) 92 % (09/13/23 1245) Vital Signs (24h Range):  Temp:  [97.4 °F (36.3 °C)-98.5 °F (36.9 °C)] 97.5 °F (36.4 °C)  Pulse:  [54-72] 69  Resp:  [18-22] 22  SpO2:  [91 %-99 %] 92 %  BP: (129-157)/(64-80) 149/76     Weight: (!) 237 kg (522 lb 7.8 oz)  Body mass index is 77.16 kg/m².    Intake/Output Summary (Last 24 hours) at 9/13/2023 1435  Last data filed at 9/13/2023 1200  Gross per 24 hour   Intake 1248.37 ml   Output 325 ml   Net 923.37 ml         Physical Exam  Constitutional:       Appearance: Normal appearance. He is obese.   HENT:      Head: Normocephalic and atraumatic.      Nose: Nose normal.      Mouth/Throat:      Mouth: Mucous membranes are moist.      Pharynx: Oropharynx is clear.   Eyes:      Extraocular Movements: Extraocular movements intact.      Conjunctiva/sclera: Conjunctivae normal.      Pupils: Pupils are equal, round, and reactive to light.   Cardiovascular:      Rate and Rhythm: Normal rate and regular rhythm.      Pulses: Normal pulses.      Heart sounds: Normal heart sounds.   Pulmonary:      Effort: Pulmonary effort is normal.      Breath sounds: Rales present.   Abdominal:      General: Bowel sounds are normal.      Palpations: Abdomen is soft.   Musculoskeletal:         General: Normal range of  motion.      Cervical back: Normal range of motion and neck supple.   Skin:     General: Skin is warm and dry.      Capillary Refill: Capillary refill takes 2 to 3 seconds.   Neurological:      General: No focal deficit present.      Mental Status: He is alert and oriented to person, place, and time. Mental status is at baseline.   Psychiatric:         Mood and Affect: Mood normal.         Behavior: Behavior normal.         Thought Content: Thought content normal.         Judgment: Judgment normal.             Significant Labs: All pertinent labs within the past 24 hours have been reviewed.  BMP:   Recent Labs   Lab 09/13/23 0437      K 3.4*   CO2 30   BUN 11.0   CREATININE 0.97   CALCIUM 8.8   MG 1.90     CBC:   Recent Labs   Lab 09/11/23 2045 09/13/23 0437   WBC 6.28 6.63   HGB 11.4* 10.1*   HCT 35.6* 31.8*    169     CMP:   Recent Labs   Lab 09/11/23 2046 09/13/23 0437    140   K 3.3* 3.4*   CO2 27 30   BUN 8.0* 11.0   CREATININE 0.86 0.97   CALCIUM 9.8 8.8   ALBUMIN 3.3* 3.0*   BILITOT 2.5* 1.8*   ALKPHOS 123 113   AST 24 21   ALT 18 16     Magnesium:   Recent Labs   Lab 09/11/23 2046 09/13/23  0437   MG 1.90 1.90       Significant Imaging: I have reviewed all pertinent imaging results/findings within the past 24 hours.

## 2023-09-13 NOTE — PT/OT/SLP PROGRESS
Physical Therapy Treatment    Patient Name:  Chepe Peralta Sr.   MRN:  [id number]    Recommendations:     Discharge Recommendations:    Discharge Equipment Recommendations:    Barriers to discharge: None    Assessment:     Chepe Peralta Sr. is a 73 y.o. male admitted with a medical diagnosis of COPD exacerbation.  He presents with the following impairments/functional limitations: weakness, impaired endurance, gait instability, impaired balance, pain, decreased safety awareness.    Pt tolerated gait in hallway fairly well. He was fatigued with SOB following, requiring several minutes to recover. He ambulated 125' with RW and CGA.    Rehab Prognosis: Good; patient would benefit from acute skilled PT services to address these deficits and reach maximum level of function.    Recent Surgery: * No surgery found *      Plan:     During this hospitalization, patient to be seen   to address the identified rehab impairments via gait training, therapeutic activities, therapeutic exercises and progress toward the following goals:    Plan of Care Expires:       Subjective     Chief Complaint: weakness, fatigue with exertion  Patient/Family Comments/goals: to gain strength  Pain/Comfort:         Objective:     Communicated with patient prior to session.  Patient found up in chair with   upon PT entry to room.     General Precautions: Standard,    Orthopedic Precautions:    Braces:    Respiratory Status: Room air     Functional Mobility:  Transfers:     Sit to Stand:  minimum assistance with rolling walker  Gait: 125' with RW and CGA  Balance: min-CGA in supported standing      AM-PAC 6 CLICK MOBILITY          Treatment & Education:  See mobility above, reviewed general exercises    Patient left up in chair with all lines intact and call button in reach..    GOALS:   Multidisciplinary Problems       Physical Therapy Goals          Problem: Physical Therapy    Goal Priority Disciplines Outcome Goal Variances Interventions    Physical Therapy Goal     PT, PT/OT Ongoing, Progressing     Description: 1.  Pt will improve bed mob to SBA  2.  Pt will improve tfs to SBA to BSC/chair  3.  Pt will amb 150ft RW SBA  4.  Pt will train HEP BLE AROM ex to maintain strength while inpt                         Time Tracking:     PT Received On: 09/13/23  PT Start Time: 1010     PT Stop Time: 1040  PT Total Time (min): 30 min     Billable Minutes: Gait Training 15 and Therapeutic Activity 15    Treatment Type: Treatment  PT/PTA: PTA           09/13/2023

## 2023-09-14 LAB
ALBUMIN SERPL-MCNC: 3.1 G/DL (ref 3.4–4.8)
ALBUMIN/GLOB SERPL: 0.8 RATIO (ref 1.1–2)
ALP SERPL-CCNC: 125 UNIT/L (ref 40–150)
ALT SERPL-CCNC: 16 UNIT/L (ref 0–55)
AST SERPL-CCNC: 26 UNIT/L (ref 5–34)
BASOPHILS # BLD AUTO: 0.02 X10(3)/MCL
BASOPHILS NFR BLD AUTO: 0.3 %
BILIRUB SERPL-MCNC: 1.3 MG/DL
BNP BLD-MCNC: 1799.1 PG/ML
BUN SERPL-MCNC: 13 MG/DL (ref 8.4–25.7)
CALCIUM SERPL-MCNC: 9.4 MG/DL (ref 8.8–10)
CHLORIDE SERPL-SCNC: 98 MMOL/L (ref 98–107)
CO2 SERPL-SCNC: 30 MMOL/L (ref 23–31)
CREAT SERPL-MCNC: 1.19 MG/DL (ref 0.73–1.18)
EOSINOPHIL # BLD AUTO: 0.16 X10(3)/MCL (ref 0–0.9)
EOSINOPHIL NFR BLD AUTO: 2.7 %
ERYTHROCYTE [DISTWIDTH] IN BLOOD BY AUTOMATED COUNT: 18.6 % (ref 11.5–17)
GFR SERPLBLD CREATININE-BSD FMLA CKD-EPI: >60 MLS/MIN/1.73/M2
GLOBULIN SER-MCNC: 4 GM/DL (ref 2.4–3.5)
GLUCOSE SERPL-MCNC: 167 MG/DL (ref 82–115)
HCT VFR BLD AUTO: 32.3 % (ref 42–52)
HGB BLD-MCNC: 10.3 G/DL (ref 14–18)
IMM GRANULOCYTES # BLD AUTO: 0.02 X10(3)/MCL (ref 0–0.04)
IMM GRANULOCYTES NFR BLD AUTO: 0.3 %
LYMPHOCYTES # BLD AUTO: 0.95 X10(3)/MCL (ref 0.6–4.6)
LYMPHOCYTES NFR BLD AUTO: 15.8 %
MAGNESIUM SERPL-MCNC: 1.7 MG/DL (ref 1.6–2.6)
MCH RBC QN AUTO: 29.8 PG (ref 27–31)
MCHC RBC AUTO-ENTMCNC: 31.9 G/DL (ref 33–36)
MCV RBC AUTO: 93.4 FL (ref 80–94)
MONOCYTES # BLD AUTO: 0.55 X10(3)/MCL (ref 0.1–1.3)
MONOCYTES NFR BLD AUTO: 9.1 %
NEUTROPHILS # BLD AUTO: 4.33 X10(3)/MCL (ref 2.1–9.2)
NEUTROPHILS NFR BLD AUTO: 71.8 %
PLATELET # BLD AUTO: 170 X10(3)/MCL (ref 130–400)
PMV BLD AUTO: 11.1 FL (ref 7.4–10.4)
POCT GLUCOSE: 171 MG/DL (ref 70–110)
POCT GLUCOSE: 207 MG/DL (ref 70–110)
POTASSIUM SERPL-SCNC: 3.5 MMOL/L (ref 3.5–5.1)
PROT SERPL-MCNC: 7.1 GM/DL (ref 5.8–7.6)
RBC # BLD AUTO: 3.46 X10(6)/MCL (ref 4.7–6.1)
SODIUM SERPL-SCNC: 138 MMOL/L (ref 136–145)
WBC # SPEC AUTO: 6.03 X10(3)/MCL (ref 4.5–11.5)

## 2023-09-14 PROCEDURE — 25000242 PHARM REV CODE 250 ALT 637 W/ HCPCS: Performed by: INTERNAL MEDICINE

## 2023-09-14 PROCEDURE — 83735 ASSAY OF MAGNESIUM: CPT | Performed by: INTERNAL MEDICINE

## 2023-09-14 PROCEDURE — 27000221 HC OXYGEN, UP TO 24 HOURS

## 2023-09-14 PROCEDURE — 80053 COMPREHEN METABOLIC PANEL: CPT | Performed by: INTERNAL MEDICINE

## 2023-09-14 PROCEDURE — 85025 COMPLETE CBC W/AUTO DIFF WBC: CPT | Performed by: INTERNAL MEDICINE

## 2023-09-14 PROCEDURE — 25000003 PHARM REV CODE 250: Performed by: INTERNAL MEDICINE

## 2023-09-14 PROCEDURE — 94761 N-INVAS EAR/PLS OXIMETRY MLT: CPT

## 2023-09-14 PROCEDURE — 63600175 PHARM REV CODE 636 W HCPCS: Performed by: INTERNAL MEDICINE

## 2023-09-14 PROCEDURE — 21400001 HC TELEMETRY ROOM

## 2023-09-14 PROCEDURE — 97530 THERAPEUTIC ACTIVITIES: CPT | Mod: CQ

## 2023-09-14 PROCEDURE — 97116 GAIT TRAINING THERAPY: CPT | Mod: CQ

## 2023-09-14 PROCEDURE — 83880 ASSAY OF NATRIURETIC PEPTIDE: CPT | Performed by: INTERNAL MEDICINE

## 2023-09-14 PROCEDURE — 94640 AIRWAY INHALATION TREATMENT: CPT

## 2023-09-14 RX ORDER — LEVOFLOXACIN 500 MG/1
500 TABLET, FILM COATED ORAL DAILY
Qty: 7 TABLET | Refills: 0 | Status: SHIPPED | OUTPATIENT
Start: 2023-09-14 | End: 2023-10-02

## 2023-09-14 RX ORDER — PREDNISONE 20 MG/1
20 TABLET ORAL 2 TIMES DAILY
Qty: 10 TABLET | Refills: 0 | Status: SHIPPED | OUTPATIENT
Start: 2023-09-14

## 2023-09-14 RX ORDER — LEVOFLOXACIN 500 MG/1
500 TABLET, FILM COATED ORAL DAILY
Status: DISCONTINUED | OUTPATIENT
Start: 2023-09-15 | End: 2023-09-15 | Stop reason: HOSPADM

## 2023-09-14 RX ADMIN — FUROSEMIDE 40 MG: 40 TABLET ORAL at 09:09

## 2023-09-14 RX ADMIN — TAMSULOSIN HYDROCHLORIDE 0.4 MG: 0.4 CAPSULE ORAL at 09:09

## 2023-09-14 RX ADMIN — CARVEDILOL 25 MG: 25 TABLET, FILM COATED ORAL at 09:09

## 2023-09-14 RX ADMIN — IPRATROPIUM BROMIDE AND ALBUTEROL SULFATE 3 ML: 2.5; .5 SOLUTION RESPIRATORY (INHALATION) at 07:09

## 2023-09-14 RX ADMIN — IPRATROPIUM BROMIDE AND ALBUTEROL SULFATE 3 ML: 2.5; .5 SOLUTION RESPIRATORY (INHALATION) at 03:09

## 2023-09-14 RX ADMIN — ATORVASTATIN CALCIUM 80 MG: 40 TABLET, FILM COATED ORAL at 09:09

## 2023-09-14 RX ADMIN — IPRATROPIUM BROMIDE AND ALBUTEROL SULFATE 3 ML: 2.5; .5 SOLUTION RESPIRATORY (INHALATION) at 11:09

## 2023-09-14 RX ADMIN — GUAIFENESIN 200 MG: 200 SOLUTION ORAL at 11:09

## 2023-09-14 RX ADMIN — GUAIFENESIN 200 MG: 200 SOLUTION ORAL at 01:09

## 2023-09-14 RX ADMIN — FUROSEMIDE 40 MG: 40 TABLET ORAL at 04:09

## 2023-09-14 RX ADMIN — INSULIN ASPART 1 UNITS: 100 INJECTION, SOLUTION INTRAVENOUS; SUBCUTANEOUS at 08:09

## 2023-09-14 RX ADMIN — CARVEDILOL 25 MG: 25 TABLET, FILM COATED ORAL at 08:09

## 2023-09-14 RX ADMIN — RIVAROXABAN 20 MG: 10 TABLET, FILM COATED ORAL at 04:09

## 2023-09-14 NOTE — PLAN OF CARE
09/14/23 1506   Final Note   Assessment Type Final Discharge Note   Anticipated Discharge Disposition Home   Post-Acute Status   Post-Acute Authorization HME   HME Status (!) Pending Delivery   Discharge Delays None known at this time

## 2023-09-14 NOTE — DISCHARGE SUMMARY
[facility] - [facility]  Hospital Medicine  Discharge Summary      Patient Name: Chepe Peralta Sr.  MRN: [id number]  LAMIN: [id number]  Patient Class: IP- Inpatient  Admission Date: 9/11/2023  Hospital Length of Stay: 2 days  Discharge Date and Time:  09/14/2023 2:24 PM  Attending Physician: Sree Romano MD   Discharging Provider: Nataliia Zimmerman MD  Primary Care Provider: Payton Lawson FNP-C    Primary Care Team: Networked reference to record PCT     HPI:    73 year old male with pmh COPD, CHF, DM II, Venous stasis presenting to ED with complaints of worsening shortness of breath and stated that he has stopped smoking. He was found to have 95% O2 sats on room air at sight by EMS.         * No surgery found *      Hospital Course:   9/12/23-Patient is c/o a cough otherwise he feels a little better today. Will continue with current treatment.    9/13/23-Patient is feeling better today.  Breathing is improving.  Will continue treatment.    9/14/23: sob improved. Satting well on RA but desatted to 85% on exertion. Qualifies for home oxygen.Labs unremarkable. Deemed stable to be d/c with home oxygen       Goals of Care Treatment Preferences:  Code Status: Full Code      Consults:     Pulmonary  * COPD exacerbation    Nebs  Follow labs  Wean O2 if possible  IV antibiotics  Add steroids  therapy      Final Active Diagnoses:    Diagnosis Date Noted POA    PRINCIPAL PROBLEM:  COPD exacerbation [J44.1] 09/12/2023 Yes    SOB (shortness of breath) [R06.02] 09/12/2023 Yes    Congestive heart failure [I50.9] 09/12/2023 Yes    Stasis dermatitis of both legs [I87.2] 06/16/2023 Yes    Diabetes mellitus [E11.9] 06/11/2023 Yes    Hypertension [I10] 06/11/2023 Yes    Atrial fibrillation [I48.91] 06/11/2023 Yes      Problems Resolved During this Admission:       Discharged Condition: stable    Disposition: Home or Self Care    Follow Up:    Patient Instructions:      OXYGEN FOR HOME USE     Order Specific  "Question Answer Comments   Liter Flow 2    Duration Continuous    Qualifying Test Performed at: Rest    Oxygen saturation: 85    Portable mode: continuous    Route nasal cannula    Device: home concentrator with portable tanks conserving device   Length of need (in months): 99 mos    Patient condition with qualifying saturation COPD    Height: 5' 9" (1.753 m)    Weight: 237 kg (522 lb 7.8 oz)    Alternative treatment measures have been tried or considered and deemed clinically ineffective. Yes      Diet Cardiac     Activity as tolerated       Significant Diagnostic Studies: Labs:   BMP:   Recent Labs   Lab 09/13/23 0437 09/14/23  0409    138   K 3.4* 3.5   CO2 30 30   BUN 11.0 13.0   CREATININE 0.97 1.19*   CALCIUM 8.8 9.4   MG 1.90 1.70    and CBC   Recent Labs   Lab 09/13/23 0437 09/14/23  0409   WBC 6.63 6.03   HGB 10.1* 10.3*   HCT 31.8* 32.3*    170       Pending Diagnostic Studies:       None           Medications:  Reconciled Home Medications:      Medication List        START taking these medications      levoFLOXacin 500 MG tablet  Commonly known as: LEVAQUIN  Take 1 tablet (500 mg total) by mouth once daily.     predniSONE 20 MG tablet  Commonly known as: DELTASONE  Take 1 tablet (20 mg total) by mouth 2 (two) times daily.            CONTINUE taking these medications      allopurinoL 300 MG tablet  Commonly known as: ZYLOPRIM  Take 300 mg by mouth.     atorvastatin 80 MG tablet  Commonly known as: LIPITOR  Take 80 mg by mouth.     carvediloL 25 MG tablet  Commonly known as: COREG  Take 25 mg by mouth 2 (two) times daily.     felodipine 5 MG 24 hr tablet  Commonly known as: PLENDIL  Take 5 mg by mouth.     furosemide 40 MG tablet  Commonly known as: LASIX  Take 40 mg by mouth 2 (two) times daily.     glimepiride 4 MG tablet  Commonly known as: AMARYL  Take 4 mg by mouth.     rivaroxaban 20 mg Tab  Commonly known as: XARELTO  Take 20 mg by mouth Daily.     sertraline 100 MG tablet  Commonly " known as: ZOLOFT  Take 100 mg by mouth.     tamsulosin 0.4 mg Cap  Commonly known as: FLOMAX  Take 1 capsule by mouth.     valsartan 40 MG tablet  Commonly known as: DIOVAN  Take 40 mg by mouth.              Indwelling Lines/Drains at time of discharge:   Lines/Drains/Airways       None                   Time spent on the discharge of patient: >30 minutes         Nataliia Zimmerman MD  Department of Hospital Medicine  [facility]

## 2023-09-14 NOTE — DISCHARGE INSTRUCTIONS
Follow up with PCP. wound care keep scheduled apt.    Yajaira needs to be called once you are home, so they can go and set up home system.  Phone is [phone].

## 2023-09-14 NOTE — PLAN OF CARE
Yajaira working on home O2 for d/c home and once portable is here,then patient can dc.  Nurse aware.

## 2023-09-14 NOTE — PLAN OF CARE
Pt on O2 at 2L/M nasal cannula, sat 99%.  Nurse removed oxygen from patient and while patient at rest for 3 minutes, room air O2 sat recorded at 85%.  Nurse replaced oxygen at 2L/M nasal cannula, and oxygen sat came up to 98%.

## 2023-09-14 NOTE — PT/OT/SLP PROGRESS
Physical Therapy Treatment    Patient Name:  Chepe Peralta Sr.   MRN:  [id number]    Recommendations:     Discharge Recommendations:    Discharge Equipment Recommendations:    Barriers to discharge: None    Assessment:     Chepe Peralta Sr. is a 73 y.o. male admitted with a medical diagnosis of COPD exacerbation.  He presents with the following impairments/functional limitations: weakness, impaired endurance, gait instability, impaired balance, pain, decreased safety awareness.    Pt ambulated on room air over 150' in hallway with RW. He was fatigued with SOB following, SPO2 85% following. He required several minutes to recover to 92%, nasal cannula donned to continue improving.     Rehab Prognosis: Good; patient would benefit from acute skilled PT services to address these deficits and reach maximum level of function.    Recent Surgery: * No surgery found *      Plan:     During this hospitalization, patient to be seen   to address the identified rehab impairments via gait training, therapeutic activities, therapeutic exercises and progress toward the following goals:    Plan of Care Expires:       Subjective     Chief Complaint: weakness, fatigue with exertion  Patient/Family Comments/goals: to gain strength  Pain/Comfort:         Objective:     Communicated with patient prior to session.  Patient found up in chair with   upon PT entry to room.     General Precautions: Standard,    Orthopedic Precautions:    Braces:    Respiratory Status: Room air     Functional Mobility:  Transfers:     Sit to Stand:  minimum assistance with rolling walker  Gait: 150' with RW and CGA  Balance: min-CGA in supported standing      AM-PAC 6 CLICK MOBILITY          Treatment & Education:  See mobility above, reviewed general exercises    Patient left up in chair with all lines intact and call button in reach..    GOALS:   Multidisciplinary Problems       Physical Therapy Goals          Problem: Physical Therapy    Goal Priority  Disciplines Outcome Goal Variances Interventions   Physical Therapy Goal     PT, PT/OT Ongoing, Progressing     Description: 1.  Pt will improve bed mob to SBA  2.  Pt will improve tfs to SBA to BSC/chair  3.  Pt will amb 150ft RW SBA  4.  Pt will train HEP BLE AROM ex to maintain strength while inpt                         Time Tracking:     PT Received On: 09/14/23  PT Start Time: 1045   PT Stop Time: 1115  PT Total Time (min): 30 min     Billable Minutes: Gait Training 15 and Therapeutic Activity 15    Treatment Type: Treatment  PT/PTA: PTA           09/14/2023

## 2023-09-15 VITALS
TEMPERATURE: 99 F | HEART RATE: 70 BPM | SYSTOLIC BLOOD PRESSURE: 150 MMHG | OXYGEN SATURATION: 97 % | RESPIRATION RATE: 20 BRPM | HEIGHT: 69 IN | DIASTOLIC BLOOD PRESSURE: 77 MMHG | BODY MASS INDEX: 46.65 KG/M2 | WEIGHT: 315 LBS

## 2023-09-15 LAB — POCT GLUCOSE: 168 MG/DL (ref 70–110)

## 2023-09-15 PROCEDURE — 97530 THERAPEUTIC ACTIVITIES: CPT

## 2023-09-15 PROCEDURE — 27000221 HC OXYGEN, UP TO 24 HOURS

## 2023-09-15 PROCEDURE — 25000003 PHARM REV CODE 250: Performed by: INTERNAL MEDICINE

## 2023-09-15 PROCEDURE — 94761 N-INVAS EAR/PLS OXIMETRY MLT: CPT

## 2023-09-15 PROCEDURE — 25000242 PHARM REV CODE 250 ALT 637 W/ HCPCS: Performed by: INTERNAL MEDICINE

## 2023-09-15 PROCEDURE — 94640 AIRWAY INHALATION TREATMENT: CPT

## 2023-09-15 PROCEDURE — 97116 GAIT TRAINING THERAPY: CPT

## 2023-09-15 RX ORDER — IPRATROPIUM BROMIDE AND ALBUTEROL SULFATE 2.5; .5 MG/3ML; MG/3ML
3 SOLUTION RESPIRATORY (INHALATION) EVERY 6 HOURS PRN
Qty: 75 ML | Refills: 0 | Status: SHIPPED | OUTPATIENT
Start: 2023-09-15 | End: 2024-09-14

## 2023-09-15 RX ADMIN — IPRATROPIUM BROMIDE AND ALBUTEROL SULFATE 3 ML: 2.5; .5 SOLUTION RESPIRATORY (INHALATION) at 03:09

## 2023-09-15 RX ADMIN — IPRATROPIUM BROMIDE AND ALBUTEROL SULFATE 3 ML: 2.5; .5 SOLUTION RESPIRATORY (INHALATION) at 12:09

## 2023-09-15 RX ADMIN — ATORVASTATIN CALCIUM 80 MG: 40 TABLET, FILM COATED ORAL at 10:09

## 2023-09-15 RX ADMIN — FUROSEMIDE 40 MG: 40 TABLET ORAL at 10:09

## 2023-09-15 RX ADMIN — CARVEDILOL 25 MG: 25 TABLET, FILM COATED ORAL at 10:09

## 2023-09-15 RX ADMIN — LEVOFLOXACIN 500 MG: 500 TABLET, FILM COATED ORAL at 10:09

## 2023-09-15 RX ADMIN — TAMSULOSIN HYDROCHLORIDE 0.4 MG: 0.4 CAPSULE ORAL at 10:09

## 2023-09-15 RX ADMIN — GUAIFENESIN 200 MG: 200 SOLUTION ORAL at 10:09

## 2023-09-15 RX ADMIN — IPRATROPIUM BROMIDE AND ALBUTEROL SULFATE 3 ML: 2.5; .5 SOLUTION RESPIRATORY (INHALATION) at 11:09

## 2023-09-15 RX ADMIN — IPRATROPIUM BROMIDE AND ALBUTEROL SULFATE 3 ML: 2.5; .5 SOLUTION RESPIRATORY (INHALATION) at 07:09

## 2023-09-15 NOTE — PHYSICIAN QUERY
PT Name: Chepe Peralta Sr.  MR #: [id number]     DOCUMENTATION CLARIFICATION     CDS: Shade Portillo RN CCDS               Contact information: [email or web address]    This form is a permanent document in the medical record.     Query Date: September 15, 2023    By submitting this query, we are merely seeking further clarification of documentation.  Please utilize your independent clinical judgment when addressing the question(s) below.  The Medical Record contains the following   Indicators   Supporting Clinical Findings Location in Medical Record    Respiratory failure       x Subjective Respiratory Signs/Symptoms: SOB, LOPEZ, Cough, etc. SOB  9/11/2023 ED provider notes     x Objective Respiratory Signs/Symptoms: Respiratory distress, Accessory muscle use, tachypnea, wheezing, etc. no pain/respiratory distress. Prolonged expiration, bilateral bronchial breathing, decreased breath sounds on bilateral bases some scattered diffuse wheezing     Respiratory distress present. Rhonchi present. 9/11/2023 ED provider notes        9/11/2023 H&P     x RR         O2 sat         O2 use patient's pulse ox on a ER arrival was 90% on room air with RR 25, pulse ox balance from 90-93% on room air on rest    Satting well on RA but desatted to 85% on exertion    O2 @ 2L 9/11/2023 ED provider notes      9/14/2023 DC summary    9/12-9/14/2023 Vitals    Blood Gas (ABG or VBG)        Hypoxia/Hypercapnia      BiPAP/Intubation/Mechanical Ventilation       x Home O2, Oxygen Dependence Qualifies for home oxygen. Deemed stable to be d/c with home oxygen  OXYGEN FOR HOME USE      Liter Flow 2   Duration Continuous    9/14/2023 DC summary     x Radiology findings CXR revealed bilateral infiltrates these right lower lobe suspicion for pneumonia 9/11/2023 ED provider notes     x Acute/Chronic Illness COPD Exacerbation  CHF exacerbation  Paroxysmal a. Fib 9/11/2023 H&P    Treatment      Other         The clinical guidelines noted are only a  system guideline. It does not replace the providers clinical judgment.      [facility] Approved Diagnostic Criteria      Acute Respiratory Failure    Hypoxic: ABG pO2<60 mmHg or O2 sat of <91% on RA   AND/OR   Hypercapnic: ABG pCO2>50 mmHg with pH <7.35   AND   Respiratory symptoms documented (Subjective: SOB; Objective: Tachypnea, respiratory distress, increased work of breathing, unable to speak in complete sentences, labored breathing, use of accessory muscles, RR>26, cyanosis, dyspnea, wheezing, stridor, lethargy)      Chronic Respiratory Failure   Hypoxic: Continuous home oxygen    AND/OR   Hypercapnic: Normal pH with high CO2 (ex. COPD)      Acute on Chronic Respiratory Failure   Hypoxic: ABG pO2>10mmHg below baseline OR ABG pO2<60 mmHg OR SpO2<91% on usual home O2  OR O2>2L/min over baseline home O2   AND/OR   Hypercapnic: ABG pCO2>50 mmHg OR pCO2>10mmHg over baseline and pH <7.35   AND   Respiratory symptoms documented          Provider, please specify the diagnosis or diagnoses associated with above clinical findings.     [  x  ] Chronic Respiratory Failure with Hypoxia   [    ] Hypoxia Only   [    ] Other Respiratory Diagnosis (please specify): _________________   [   ] Clinically Undetermined       Please document in your progress notes daily for the duration of treatment until resolved and include in your discharge summary.     Form No. [id number]

## 2023-09-15 NOTE — PLAN OF CARE
09/15/23 1003   Final Note   Assessment Type Final Discharge Note   Anticipated Discharge Disposition Home   Post-Acute Status   HME Status Set-up Complete/Auth obtained   Discharge Delays None known at this time     Oxygen portable has been delivered to room.  Per Yajaira, when he gets home,they will go out to the home and set up the home system.

## 2023-09-15 NOTE — NURSING
Telemed sent to hospitalist in regards to pt not having IV access and has IV abx scheduled. Included in message that pt was supposed to be discharged on today but there was a dilemma with pt receiving home o2. Received call from on call hospitalist, Dr. Patel whom gave orders to leave keep IV out and he will switch IV abx to oral abx. Pt stable. Will continue to monitor.

## 2023-09-15 NOTE — PT/OT/SLP PROGRESS
"Physical Therapy Treatment    Patient Name:  Chepe Peralta Sr.   MRN:  [id number]    Recommendations:     Discharge Recommendations: Hm with HH nsg   Discharge Equipment Recommendations: none   Barriers to discharge: None    Assessment:     Chepe Peralta Sr. is a 73 y.o. male admitted with a medical diagnosis of COPD exacerbation.  He presents with the following impairments/functional limitations: weakness, impaired endurance, impaired sensation, impaired self care skills, impaired functional mobility, gait instability, impaired balance, visual deficits, impaired cognition, decreased coordination, decreased lower extremity function, decreased safety awareness, decreased ROM, impaired skin, edema, impaired cardiopulmonary response to activity .    Rehab Prognosis: Good; patient would benefit from acute skilled PT services to address these deficits and reach maximum level of function.    Recent Surgery: * No surgery found *      Plan:     During this hospitalization, patient to be seen   to address the identified rehab impairments via gait training, therapeutic activities, therapeutic exercises and progress toward the following goals:    Plan of Care Expires:       Subjective     Chief Complaint: "I wish I could walk out the door and head home."  Patient/Family Comments/goals: to go home soon  Pain/Comfort:         Objective:     Communicated with pt and nurse prior to session.  Patient found HOB elevated with   upon PT entry to room.     General Precautions: Standard,    Orthopedic Precautions:    Braces:    Respiratory Status: Nasal cannula, flow 2 L/min     Functional Mobility:  Bed Mobility:     Supine to Sit: contact guard assistance  Transfers:     Sit to Stand:  contact guard assistance with rolling walker  Gait: 100ft x2 RW SBA  Balance: standing fair      AM-PAC 6 CLICK MOBILITY          Treatment & Education:  Safety with RW    Patient left up in chair with all lines intact, call button in reach, and " chair alarm on..    GOALS:   Multidisciplinary Problems       Physical Therapy Goals          Problem: Physical Therapy    Goal Priority Disciplines Outcome Goal Variances Interventions   Physical Therapy Goal     PT, PT/OT Ongoing, Progressing     Description: 1.  Pt will improve bed mob to SBA  2.  Pt will improve tfs to SBA to BSC/chair  3.  Pt will amb 150ft RW SBA  4.  Pt will train HEP BLE AROM ex to maintain strength while inpt                         Time Tracking:     PT Received On: 09/15/23  PT Start Time: 0800     PT Stop Time: 0830  PT Total Time (min): 30 min     Billable Minutes: Gait Training 15 and Therapeutic Activity 15    Treatment Type: Treatment  PT/PTA: PT           09/15/2023

## 2023-09-15 NOTE — PHYSICIAN QUERY
PT Name: Chepe Peralta Sr.  MR #: [id number]     DOCUMENTATION CLARIFICATION      CDS: Shade Portillo RN CCDS               Contact information: [email or web address]      This form is a permanent document in the medical record.     Query Date: September 15, 2023    Dear Provider,  By submitting this query, we are merely seeking further clarification of documentation.  Please utilize your independent clinical judgment when addressing the question(s) below.     The Medical Record contains the following:    Supporting Clinical Findings Location in Medical Record   CXR revealed bilateral infiltrates these right lower lobe suspicion for pneumonia    patient's pulse ox on a ER arrival was 90% on room air with RR 25, pulse ox balance from 90-93% on room air on rest    Prolonged expiration, bilateral bronchial breathing, decreased breath sounds on bilateral bases some scattered diffuse wheezing  9/11/2023 ED provider notes   complaints of worsening shortness of breath. Cough. chest tightness. Respiratory distress present. Rhonchi present.    COPD Exacerbation  CHF exacerbation  Paroxysmal a. Fib 9/11/2023 H&P   Discharge medication: levoFLOXacin 500 mg PO daily    Satting well on RA but desatted to 85% on exertion. Qualifies for home oxygen. Deemed stable to be d/c with home oxygen   9/14/2023 DC summary   T 97.3-98.5, HR 65-74, RR 18-26, /64  O2 @ 2L 9/11-9/12/2023 Vitals  9/12-9/14/2023 Vitals    09/11/23 20:45 09/13/23 04:37 09/14/23 04:09   WBC 6.28 6.63 6.03    Lab values   Impression:  1. Interim patchy hazy opacification right lower lung and left lung base suspicious for infiltrate, atelectasis, and pleural reaction  2. Borderline cardiomegaly  3. Atherosclerosis 9/11/2023 Chest x-ray   azithromycin (ZITHROMAX) 500 mg IVPB q24h  cefTRIAXone (ROCEPHIN) 1 g IVPB q24h  levoFLOXacin 500 mg PO daily 9/11-9/13/2023 Medications    9/15/2023 Medication     Please clarify if the Pneumonia diagnosis has been:    [  x  ] Ruled In   [   ] Ruled Out   [   ] Still to be ruled out at the time of discharge   [   ] Other/Clarification of findings (please specify): _______________    [   ] Clinically undetermined         Please document in your progress notes daily for the duration of treatment, until resolved, and include in your discharge summary.    Form No. [id number]

## 2023-09-18 ENCOUNTER — PATIENT OUTREACH (OUTPATIENT)
Dept: ADMINISTRATIVE | Facility: CLINIC | Age: 73
End: 2023-09-18
Payer: MEDICARE

## 2023-09-18 NOTE — PROGRESS NOTES
C3 nurse attempted to contact Chepe Peralta Sr. for a TCC post hospital discharge follow up call. No answer. The patient does not have a scheduled HOSFU appointment, and the pt does not have an [facility] PCP.

## 2023-09-19 NOTE — PROGRESS NOTES
3rd attempt-C3 nurse attempted to contact Chepe Peralta Sr. for a TCC post hospital discharge follow up call. No answer. The patient does not have a scheduled HOSFU appointment, and the pt does not have an [facility] PCP.

## 2023-09-19 NOTE — PROGRESS NOTES
2nd attempt-C3 nurse attempted to contact Chepe Peralta Sr. for a TCC post hospital discharge follow up call. No answer. The patient does not have a scheduled HOSFU appointment, and the pt does not have an [facility] PCP.

## 2023-10-02 ENCOUNTER — HOSPITAL ENCOUNTER (OUTPATIENT)
Dept: WOUND CARE | Facility: HOSPITAL | Age: 73
Discharge: HOME | End: 2023-10-02
Attending: NURSE PRACTITIONER
Payer: MEDICARE

## 2023-10-02 VITALS
HEIGHT: 69 IN | BODY MASS INDEX: 35.1 KG/M2 | HEART RATE: 69 BPM | RESPIRATION RATE: 18 BRPM | SYSTOLIC BLOOD PRESSURE: 135 MMHG | DIASTOLIC BLOOD PRESSURE: 65 MMHG | WEIGHT: 237 LBS

## 2023-10-02 DIAGNOSIS — I83.891 VENOUS STASIS ULCER OF RIGHT LOWER LEG WITH EDEMA OF RIGHT LOWER LEG: ICD-10-CM

## 2023-10-02 DIAGNOSIS — B07.9 VERRUCA VULGARIS OF LOWER EXTREMITY: ICD-10-CM

## 2023-10-02 DIAGNOSIS — L97.919 VENOUS STASIS ULCER OF RIGHT LOWER LEG WITH EDEMA OF RIGHT LOWER LEG: ICD-10-CM

## 2023-10-02 DIAGNOSIS — R60.0 VENOUS STASIS ULCER OF RIGHT LOWER LEG WITH EDEMA OF RIGHT LOWER LEG: ICD-10-CM

## 2023-10-02 DIAGNOSIS — I87.2 VENOUS STASIS DERMATITIS OF BOTH LOWER EXTREMITIES: ICD-10-CM

## 2023-10-02 DIAGNOSIS — L03.119 CELLULITIS OF LOWER EXTREMITY, UNSPECIFIED LATERALITY: ICD-10-CM

## 2023-10-02 DIAGNOSIS — R60.9 EDEMA, UNSPECIFIED TYPE: ICD-10-CM

## 2023-10-02 DIAGNOSIS — I83.019 VENOUS STASIS ULCER OF RIGHT LOWER LEG WITH EDEMA OF RIGHT LOWER LEG: ICD-10-CM

## 2023-10-02 DIAGNOSIS — I87.2 STASIS DERMATITIS OF BOTH LEGS: ICD-10-CM

## 2023-10-02 DIAGNOSIS — I87.2 CHRONIC VENOUS INSUFFICIENCY: Primary | ICD-10-CM

## 2023-10-02 PROCEDURE — 99213 OFFICE O/P EST LOW 20 MIN: CPT | Mod: 25

## 2023-10-02 PROCEDURE — 29580 STRAPPING UNNA BOOT: CPT

## 2023-10-02 PROCEDURE — 27000999 HC MEDICAL RECORD PHOTO DOCUMENTATION

## 2023-10-02 NOTE — PROGRESS NOTES
Home Health: NA  Smoker   [] Yes   [x] No   Diabetic  [x] Yes  [] No   Vascular Surgeon: NS (Dr. Sevilla -- Cardiologist)  Vascular procedures [] Yes [x] No    Recent Ultrasounds [x] Yes [] No   If so, when were they done? 06/26/2023  Compression Pumps  [] Yes [x] No      8/14/23:  Right ankle - 29.0  Right calf - 42.5  Left ankle - 30.0  Left calf - 45.5    8/7/23:  Right ankle - 27.5  Right calf -41.5  Left ankle -  28  Left calf -43    8/22/23:  Right ankle - 29.5  Right calf - 40.2  Left ankle - 29..5  Left calf- 44    10/2/23  Right ankle - 30  Right calf - 42.5  Left ankle - 30  Left calf 43.5    Doppler done in office? [] Yes [x] No        Right dorsalis:     Right posterior:     Left dorsalis:     Left posterior:  Is the patient eligible for a graft, and/or currently grafting? [] Yes [x] No    If so, which one/size?      Subjective:       Patient ID: Chepe Peralta Sr. is a 73 y.o. male.    Chief Complaint: Venous Ulcer (Right lower leg /Left lower leg )    HPI    6/16/23 (Dr. Ann) - 72-year-old white male, who was referred from the emergency room several days ago, for stasis dermatitis and possible early cellulitis both lower legs.  He also has some edema around his ankles lower legs and feet.  He had this a few years ago also.  He has never been diagnosed with venous insufficiency as for a as he knows.  He also has not been diagnosed with arterial peripheral disease.  He does have a history of CHF, with atrial fibrillation.  He is scheduled to see his cardiologist soon.  He tells me that the erythema has improved since he was in the emergency room last week.  He also has some small blisters posteriorly on both calves.  There was no venous reflux noted in either leg on a venous ultrasound in October 2020.  Presented to [place] ED on 06/11/2023 with c/o leg swelling and redness with blisters appearing. Patient was given PO Augmentin and told to make an appointment with wound care. Is a patient of Dr. Sevilla  with [facility] but denies ever having any ultrasounds of his legs and has never has any stents placed in legs. Has had a CABG done in the past; patient in unsure of exact date but states it has been around 10-12 years and is currently taking Xarelto. DMII with a CBG of 131 today. Has full sensation of his feet. Kristen Rosario is 10/10.     8/7/23 - Mr. Peralta returns to clinic today for follow up on his bilateral lower extremities.  He was seen at wound care on 06/16/2023 by Dr. Ann and was given steroid cream and had ultrasounds ordered. Ultrasounds completed on 06/26/2023. Patient has been using hibiclens at home and the bilateral legs have improved.  There is still hemosiderin staining present, as well as lipodermatosclerosis.  Today the majority of the blistered areas are resolved.  We did review his ultrasound results and because there are no significant arterial concerns we will move forward with application of unna boots. There is very little drainage noted, and because of that we will leave the unna boots in place for one week without change.     8/14/23 - The patient returns today for his bilateral lower extremities.  He did have a small open area on the left lower extremity which has, for the most part, resolved.  There is still some very slight amount of drainage, however, the Unna boots do appear to be working.  We are measuring for compression leaves and will be having them go out to assess the patient for those pumps.  For now, we will continue to apply the Unna boots bilaterally.  Because there is very scant amount of drainage he is able to leave the Unna boots in place for 1 week without change, so does not need home health at this time.    8/22/23 - Mr. Peralta is following up today for his bilateral lower extremities.  Both legs were assessed today and there are no open areas on either legs.  He will return in 1 month for followup but in the meantime he was given tubigrips to wear on a daily basis.   "He was instructed to call should any areas open prior to the 1 month visit.    10/2/23 - the patient returns today with scattered venous ulcers on his bilateral lower extremities.  He reports that he was admitted to [facility] from 9/11/23 - 9/14/23 with SOB. He was discharged with Levaquin, which he has finished.  He had unna boots applied at his last wound clinic visit which was on 9/1/23.  Boots were still on when he was admitted on 9/14/23.  He reports that the hospital never removed those Unna boots and he still has the original Unna boots on today which were applied on 09/01/2023.  This has resulted in multiple additional venous ulcers and discomfort to the patient.  We will replace those Unna boots today applying silver alginate over the open areas.  He does have 2+ edema and reports that he has an appointment with Dr. Sevilla in a week or 2 but that he is taking his Lasix.  He was additionally sent home on home oxygen which he states he only needs every couple of days.    Review of Systems   Constitutional: Negative.    HENT: Negative.     Respiratory: Negative.     Cardiovascular: Negative.    Skin:         As documented in the HPI.   All other systems reviewed and are negative.        Objective:      Vitals:    10/02/23 1111   BP: 135/65   BP Location: Right arm   Patient Position: Sitting   Pulse: 69   Resp: 18   Weight: 107.5 kg (237 lb)   Height: 5' 9" (1.753 m)        Physical Exam  Vitals and nursing note reviewed.   Constitutional:       Appearance: Normal appearance.   Cardiovascular:      Rate and Rhythm: Normal rate.   Pulmonary:      Effort: Pulmonary effort is normal.   Skin:     General: Skin is warm and dry.      Comments: Blisters to bilateral calves.   Neurological:      Mental Status: He is alert.            Altered Skin Integrity 09/01/23 1204 Right lower Leg #1 (Active)   09/01/23 1204   Altered Skin Integrity Present on Admission - Did Patient arrive to the hospital with altered skin?: yes   Side: " Right   Orientation: lower   Location: Leg   Wound Number: #1   Is this injury device related?: No   Primary Wound Type:    Description of Altered Skin Integrity:    Ankle-Brachial Index:    Pulses:    Removal Indication and Assessment:    Wound Outcome:    (Retired) Wound Length (cm):    (Retired) Wound Width (cm):    (Retired) Depth (cm):    Wound Description (Comments):    Removal Indications:    Dressing Appearance Moist drainage 10/02/23 1118   Drainage Amount Moderate 10/02/23 1118   Drainage Characteristics/Odor Serosanguineous 10/02/23 1118   Appearance Pink;Red;Moist 10/02/23 1118   Tissue loss description Full thickness 10/02/23 1118   Periwound Area Edematous;Redness;Moist 10/02/23 1118   Wound Edges Defined 10/02/23 1118   Wound Length (cm) 6 cm 10/02/23 1118   Wound Width (cm) 9.3 cm 10/02/23 1118   Wound Depth (cm) 0.2 cm 10/02/23 1118   Wound Volume (cm^3) 11.16 cm^3 10/02/23 1118   Wound Surface Area (cm^2) 55.8 cm^2 10/02/23 1118   Care Cleansed with:;Wound cleanser 10/02/23 1118   Dressing Applied 10/02/23 1118   Dressing Change Due 10/05/23 10/02/23 1118            Altered Skin Integrity 09/01/23 1204 Left lower Leg #2 (Active)   09/01/23 1204   Altered Skin Integrity Present on Admission - Did Patient arrive to the hospital with altered skin?: yes   Side: Left   Orientation: lower   Location: Leg   Wound Number: #2   Is this injury device related?: No   Primary Wound Type:    Description of Altered Skin Integrity:    Ankle-Brachial Index:    Pulses:    Removal Indication and Assessment:    Wound Outcome:    (Retired) Wound Length (cm):    (Retired) Wound Width (cm):    (Retired) Depth (cm):    Wound Description (Comments):    Removal Indications:    Dressing Appearance Moist drainage 10/02/23 1118   Drainage Amount Moderate 10/02/23 1118   Drainage Characteristics/Odor Serosanguineous 10/02/23 1118   Appearance Pink;Red;Blistered;Moist 10/02/23 1118   Tissue loss description Full thickness  10/02/23 1118   Periwound Area Moist;Redness;Edematous 10/02/23 1118   Wound Edges Defined 10/02/23 1118   Wound Length (cm) 17.6 cm 10/02/23 1118   Wound Width (cm) 10 cm 10/02/23 1118   Wound Depth (cm) 0.2 cm 10/02/23 1118   Wound Volume (cm^3) 35.2 cm^3 10/02/23 1118   Wound Surface Area (cm^2) 176 cm^2 10/02/23 1118   Care Cleansed with:;Wound cleanser 10/02/23 1118   Dressing Applied 10/02/23 1118   Dressing Change Due 10/05/23 10/02/23 1118                                            10/2/23       Left lower leg (pre)                                       Right lower leg (pre)  (silver alginate, zinc unna boot, kerlix, coban)  (silver alginate, zinc unna boot, kerlix, coban)  Assessment:         ICD-10-CM ICD-9-CM   1. Chronic venous insufficiency  I87.2 459.81   2. Venous stasis ulcer of right lower leg with edema of right lower leg  I83.019 454.8    I83.891 454.0    L97.919     R60.0    3. Venous stasis dermatitis of both lower extremities  I87.2 454.1   4. Verruca vulgaris of lower extremity  B07.9 078.10   5. Stasis dermatitis of both legs  I87.2 454.1   6. Cellulitis of lower extremity, unspecified laterality  L03.119 682.6   7. Edema, unspecified type  R60.9 782.3           Procedures:     Mechanical debridement only    Excisional debridement performed:  [] Yes [] No   Selective debridement performed:  [] Yes [] No   Mechanical debridement performed:  [x] Yes [] No   Silver nitrate applied :    [] Yes [] No   Labs ordered this visit:   [] Yes [] No   Imaging ordered this visit:   [] Yes [] No   Tissue pathology and/or culture taken:  [] Yes [] No     Imaging:  Arterial:  FINDINGS:  Ultrasound Doppler and color flow imaging were performed on the arteries of the lower extremities bilaterally. There is scattered plaque formation at the arteries of lower extremities bilaterally with less than 30% stenosis at the common femoral arteries bilaterally.  A tri phasic flow wave pattern is evident throughout the  visualized arteries of the lower extremities bilaterally.   Impression:   1. Scattered atherosclerotic plaque formation throughout the arteries of the lower extremities bilaterally with less than 30% stenosis at the common femoral arteries bilaterally.    Venous:  FINDINGS:  There is normal compression and flow noted in the  common femoral vein, superficial femoral vein, popliteal vein, and  posterior tibial veinbilaterally.  No evidence of deep venous thrombosis is identified.  The left greater saphenous vein is surgically absent.  No venous reflux is identified.  Impression:   1.  No deep venous thrombosis identified to the lower extremities bilaterally   2.  Surgically absent left greater saphenous vein      Procedures:  HOME HEALTH AGENCY: none  TIMES PER WEEK/DAYS:  ORDERS:  Bilateral legs:   cleanse with wound cleanser, apply silver alginate to open weeping areas, wrap with zinc unna boots, kerlix and coban, to be changed on Thursdays and Mondays    Follow up in 3 days (on 10/5/2023) for BLE/unna boots.        Electronically signed,    Marta Heard

## 2023-12-21 ENCOUNTER — HOSPITAL ENCOUNTER (OUTPATIENT)
Dept: WOUND CARE | Facility: HOSPITAL | Age: 73
Discharge: HOME | End: 2023-12-21
Attending: FAMILY MEDICINE
Payer: MEDICARE

## 2023-12-21 VITALS
HEART RATE: 71 BPM | DIASTOLIC BLOOD PRESSURE: 59 MMHG | HEIGHT: 69 IN | BODY MASS INDEX: 35.1 KG/M2 | RESPIRATION RATE: 19 BRPM | WEIGHT: 237 LBS | SYSTOLIC BLOOD PRESSURE: 124 MMHG

## 2023-12-21 DIAGNOSIS — I87.2 CHRONIC VENOUS INSUFFICIENCY: Primary | ICD-10-CM

## 2023-12-21 PROCEDURE — 99213 OFFICE O/P EST LOW 20 MIN: CPT

## 2023-12-21 PROCEDURE — 27000999 HC MEDICAL RECORD PHOTO DOCUMENTATION

## 2023-12-21 RX ORDER — SACUBITRIL AND VALSARTAN 97; 103 MG/1; MG/1
TABLET, FILM COATED ORAL
COMMUNITY
Start: 2023-12-19

## 2023-12-21 NOTE — PROGRESS NOTES
Home Health: [facility]    Smoker   [] Yes   [x] No   Diabetic  [x] Yes  [] No   Vascular Surgeon: NS (Dr. Sevilla -- Cardiologist)  Vascular procedures [] Yes [x] No    Recent Ultrasounds [x] Yes [] No   If so, when were they done? 06/26/2023  Compression Pumps  [] Yes [x] No      December 21, 2023:  73-year-old white male, with chronic bilateral venous insufficiency, was here several months ago for venous stasis.  The home health services have been seeing him twice a week, for continuous Unna boot applications.  He admits to very good results, with regard to the edema.  He has some superficial ulcerations are abrasions on the left lower leg.  There is no significant edema in either leg.    8/14/23:  Right ankle - 29.0  Right calf - 42.5  Left ankle - 30.0  Left calf - 45.5    8/7/23:  Right ankle - 27.5  Right calf -41.5  Left ankle -  28  Left calf -43    8/22/23:  Right ankle - 29.5  Right calf - 40.2  Left ankle - 29..5  Left calf- 44    10/2/23  Right ankle - 30  Right calf - 42.5  Left ankle - 30  Left calf 43.5    12/21/23  Right ankle - 22  Right calf - 34  Left ankle - 23  Left calf - 32    Doppler done in office? [] Yes [x] No        Right dorsalis:     Right posterior:     Left dorsalis:     Left posterior:  Is the patient eligible for a graft, and/or currently grafting? [] Yes [x] No    If so, which one/size?    NOTES  Right lower leg healed today  D/C unna boots today       Subjective:       Patient ID: Chepe Peralta Sr. is a 73 y.o. male.    Chief Complaint: Venous Ulcer (Right lower leg /Left lower leg)    HPI    6/16/23 (Dr. Ann) - 72-year-old white male, who was referred from the emergency room several days ago, for stasis dermatitis and possible early cellulitis both lower legs.  He also has some edema around his ankles lower legs and feet.  He had this a few years ago also.  He has never been diagnosed with venous insufficiency as for a as he knows.  He also has not been diagnosed with  arterial peripheral disease.  He does have a history of CHF, with atrial fibrillation.  He is scheduled to see his cardiologist soon.  He tells me that the erythema has improved since he was in the emergency room last week.  He also has some small blisters posteriorly on both calves.  There was no venous reflux noted in either leg on a venous ultrasound in October 2020.  Presented to [place] ED on 06/11/2023 with c/o leg swelling and redness with blisters appearing. Patient was given PO Augmentin and told to make an appointment with wound care. Is a patient of Dr. Sevilla with [facility] but denies ever having any ultrasounds of his legs and has never has any stents placed in legs. Has had a CABG done in the past; patient in unsure of exact date but states it has been around 10-12 years and is currently taking Xarelto. DMII with a CBG of 131 today. Has full sensation of his feet. Riparius Abraham is 10/10.     8/7/23 - Mr. Peralta returns to clinic today for follow up on his bilateral lower extremities.  He was seen at wound care on 06/16/2023 by Dr. Ann and was given steroid cream and had ultrasounds ordered. Ultrasounds completed on 06/26/2023. Patient has been using hibiclens at home and the bilateral legs have improved.  There is still hemosiderin staining present, as well as lipodermatosclerosis.  Today the majority of the blistered areas are resolved.  We did review his ultrasound results and because there are no significant arterial concerns we will move forward with application of unna boots. There is very little drainage noted, and because of that we will leave the unna boots in place for one week without change.     8/14/23 - The patient returns today for his bilateral lower extremities.  He did have a small open area on the left lower extremity which has, for the most part, resolved.  There is still some very slight amount of drainage, however, the Unna boots do appear to be working.  We are measuring for  compression leaves and will be having them go out to assess the patient for those pumps.  For now, we will continue to apply the Unna boots bilaterally.  Because there is very scant amount of drainage he is able to leave the Unna boots in place for 1 week without change, so does not need home health at this time.    8/22/23 - Mr. Peralta is following up today for his bilateral lower extremities.  Both legs were assessed today and there are no open areas on either legs.  He will return in 1 month for followup but in the meantime he was given tubigrips to wear on a daily basis.  He was instructed to call should any areas open prior to the 1 month visit.    10/2/23 - the patient returns today with scattered venous ulcers on his bilateral lower extremities.  He reports that he was admitted to [facility] from 9/11/23 - 9/14/23 with SOB. He was discharged with Levaquin, which he has finished.  He had unna boots applied at his last wound clinic visit which was on 9/1/23.  Boots were still on when he was admitted on 9/14/23.  He reports that the hospital never removed those Unna boots and he still has the original Unna boots on today which were applied on 09/01/2023.  This has resulted in multiple additional venous ulcers and discomfort to the patient.  We will replace those Unna boots today applying silver alginate over the open areas.  He does have 2+ edema and reports that he has an appointment with Dr. Sevilla in a week or 2 but that he is taking his Lasix.  He was additionally sent home on home oxygen which he states he only needs every couple of days.    Review of Systems   Constitutional: Negative.    HENT: Negative.     Respiratory: Negative.     Cardiovascular: Negative.    Skin:         As documented in the HPI.   All other systems reviewed and are negative.        Objective:      Vitals:    12/21/23 1409   BP: (!) 124/59   BP Location: Right arm   Patient Position: Sitting   Pulse: 71   Resp: 19   Weight: 107.5 kg (237 lb)  "  Height: 5' 9" (1.753 m)        Physical Exam  Vitals and nursing note reviewed.   Constitutional:       Appearance: Normal appearance.   Cardiovascular:      Rate and Rhythm: Normal rate.   Pulmonary:      Effort: Pulmonary effort is normal.   Skin:     General: Skin is warm and dry.      Comments: Both of his lower legs look very good, without significant edema.  There are some abrasions versus superficial venous stasis ulcers on the left lower leg.  There is no secondary infection.   Neurological:      Mental Status: He is alert.            Altered Skin Integrity 09/01/23 1204 Left lower Leg #2 (Active)   09/01/23 1204   Altered Skin Integrity Present on Admission - Did Patient arrive to the hospital with altered skin?: yes   Side: Left   Orientation: lower   Location: Leg   Wound Number: #2   Is this injury device related?: No   Primary Wound Type:    Description of Altered Skin Integrity:    Ankle-Brachial Index:    Pulses:    Removal Indication and Assessment:    Wound Outcome:    (Retired) Wound Length (cm):    (Retired) Wound Width (cm):    (Retired) Depth (cm):    Wound Description (Comments):    Removal Indications:    Dressing Appearance Moist drainage 12/21/23 1410   Drainage Amount Moderate 12/21/23 1410   Drainage Characteristics/Odor Serosanguineous 12/21/23 1410   Appearance Moist;Red;Granulating 12/21/23 1410   Tissue loss description Full thickness 12/21/23 1410   Periwound Area Intact;Moist 12/21/23 1410   Wound Edges Open 12/21/23 1410   Wound Length (cm) 8 cm 12/21/23 1410   Wound Width (cm) 3 cm 12/21/23 1410   Wound Depth (cm) 0.2 cm 12/21/23 1410   Wound Volume (cm^3) 4.8 cm^3 12/21/23 1410   Wound Surface Area (cm^2) 24 cm^2 12/21/23 1410   Care Cleansed with:;Wound cleanser 12/21/23 1410   Dressing Applied;Other (comment) 12/21/23 1410   Dressing Change Due 12/22/23 12/21/23 1410                                            12/21/23    Right lower leg (HEALED)  Tubigrip       Left lower leg "   Silver alginate, 6x6 gentle foam border dressing, tubigrip  CT    Assessment:         ICD-10-CM ICD-9-CM   1. Chronic venous insufficiency  I87.2 459.81           Procedures:     Mechanical debridement only    Excisional debridement performed:  [] Yes [x] No   Selective debridement performed:  [] Yes [x] No   Mechanical debridement performed:  [x] Yes [] No   Silver nitrate applied :    [] Yes [] No   Labs ordered this visit:   [] Yes [] No   Imaging ordered this visit:   [] Yes [] No   Tissue pathology and/or culture taken:  [] Yes [] No     Imaging:  Arterial:  FINDINGS:  Ultrasound Doppler and color flow imaging were performed on the arteries of the lower extremities bilaterally. There is scattered plaque formation at the arteries of lower extremities bilaterally with less than 30% stenosis at the common femoral arteries bilaterally.  A tri phasic flow wave pattern is evident throughout the visualized arteries of the lower extremities bilaterally.   Impression:   1. Scattered atherosclerotic plaque formation throughout the arteries of the lower extremities bilaterally with less than 30% stenosis at the common femoral arteries bilaterally.    Venous:  FINDINGS:  There is normal compression and flow noted in the  common femoral vein, superficial femoral vein, popliteal vein, and  posterior tibial veinbilaterally.  No evidence of deep venous thrombosis is identified.  The left greater saphenous vein is surgically absent.  No venous reflux is identified.  Impression:   1.  No deep venous thrombosis identified to the lower extremities bilaterally   2.  Surgically absent left greater saphenous vein      Procedures:  HOME HEALTH AGENCY: [facility]    TIMES PER WEEK/DAYS:  ORDERS:  Right lower leg: Apply size F tubigrip daily  Left lower leg wound: Cleanse with wound cleanser, apply silver alginate, 6x6 gentle foam border dressing, and tubigrip to be changed daily     Follow up in 2 weeks (on 1/4/2024) for left lower  leg .

## 2024-01-04 ENCOUNTER — HOSPITAL ENCOUNTER (OUTPATIENT)
Dept: WOUND CARE | Facility: HOSPITAL | Age: 74
Discharge: HOME | End: 2024-01-04
Attending: NURSE PRACTITIONER
Payer: MEDICARE

## 2024-01-04 VITALS
SYSTOLIC BLOOD PRESSURE: 126 MMHG | WEIGHT: 237 LBS | RESPIRATION RATE: 18 BRPM | HEART RATE: 55 BPM | BODY MASS INDEX: 35.1 KG/M2 | HEIGHT: 69 IN | DIASTOLIC BLOOD PRESSURE: 59 MMHG

## 2024-01-04 DIAGNOSIS — L97.919 VENOUS STASIS ULCER OF RIGHT LOWER LEG WITH EDEMA OF RIGHT LOWER LEG: ICD-10-CM

## 2024-01-04 DIAGNOSIS — I87.2 CHRONIC VENOUS INSUFFICIENCY: Primary | ICD-10-CM

## 2024-01-04 DIAGNOSIS — R60.0 VENOUS STASIS ULCER OF RIGHT LOWER LEG WITH EDEMA OF RIGHT LOWER LEG: ICD-10-CM

## 2024-01-04 DIAGNOSIS — I83.019 VENOUS STASIS ULCER OF RIGHT LOWER LEG WITH EDEMA OF RIGHT LOWER LEG: ICD-10-CM

## 2024-01-04 DIAGNOSIS — Z51.89 VISIT FOR WOUND CHECK: ICD-10-CM

## 2024-01-04 DIAGNOSIS — I87.2 STASIS DERMATITIS OF BOTH LEGS: ICD-10-CM

## 2024-01-04 DIAGNOSIS — I87.2 VENOUS STASIS DERMATITIS OF BOTH LOWER EXTREMITIES: ICD-10-CM

## 2024-01-04 DIAGNOSIS — I83.891 VENOUS STASIS ULCER OF RIGHT LOWER LEG WITH EDEMA OF RIGHT LOWER LEG: ICD-10-CM

## 2024-01-04 PROCEDURE — 99212 OFFICE O/P EST SF 10 MIN: CPT

## 2024-01-04 PROCEDURE — 27000999 HC MEDICAL RECORD PHOTO DOCUMENTATION

## 2024-01-04 RX ORDER — INSULIN GLARGINE 100 [IU]/ML
INJECTION, SOLUTION SUBCUTANEOUS
COMMUNITY
Start: 2023-12-28

## 2024-01-04 NOTE — PROGRESS NOTES
[facility]    Smoker   [] Yes   [x] No   Diabetic  [x] Yes  [] No   Last CB, fasting   Vascular Surgeon: NS (Dr. Sevilla -- Cardiologist)  Vascular procedures [] Yes [x] No    Recent Ultrasounds [x] Yes [] No   If so, when were they done? 2023  Compression Pumps  [] Yes [x] No    Doppler done in office? [] Yes [x] No     Is the patient eligible for a graft, and/or currently grafting? [] Yes [x] No    If so, which one/size?      Subjective:       Patient ID: Chepe Peralta Sr. is a 73 y.o. male.    Chief Complaint: Venous Ulcer (Left lower leg )    HPI    23 (Dr. Ann) - 72-year-old white male, who was referred from the emergency room several days ago, for stasis dermatitis and possible early cellulitis both lower legs.  He also has some edema around his ankles lower legs and feet.  He had this a few years ago also.  He has never been diagnosed with venous insufficiency as for a as he knows.  He also has not been diagnosed with arterial peripheral disease.  He does have a history of CHF, with atrial fibrillation.  He is scheduled to see his cardiologist soon.  He tells me that the erythema has improved since he was in the emergency room last week.  He also has some small blisters posteriorly on both calves.  There was no venous reflux noted in either leg on a venous ultrasound in 2020.  Presented to [place] ED on 2023 with c/o leg swelling and redness with blisters appearing. Patient was given PO Augmentin and told to make an appointment with wound care. Is a patient of Dr. Sevilla with [facility] but denies ever having any ultrasounds of his legs and has never has any stents placed in legs. Has had a CABG done in the past; patient in unsure of exact date but states it has been around 10-12 years and is currently taking Xarelto. DMII with a CBG of 131 today. Has full sensation of his feet. Kristen Rosario is 10/10.     23 - Mr. Peralta returns to clinic today for follow  up on his bilateral lower extremities.  He was seen at wound care on 06/16/2023 by Dr. Ann and was given steroid cream and had ultrasounds ordered. Ultrasounds completed on 06/26/2023. Patient has been using hibiclens at home and the bilateral legs have improved.  There is still hemosiderin staining present, as well as lipodermatosclerosis.  Today the majority of the blistered areas are resolved.  We did review his ultrasound results and because there are no significant arterial concerns we will move forward with application of unna boots. There is very little drainage noted, and because of that we will leave the unna boots in place for one week without change.     8/14/23 - The patient returns today for his bilateral lower extremities.  He did have a small open area on the left lower extremity which has, for the most part, resolved.  There is still some very slight amount of drainage, however, the Unna boots do appear to be working.  We are measuring for compression leaves and will be having them go out to assess the patient for those pumps.  For now, we will continue to apply the Unna boots bilaterally.  Because there is very scant amount of drainage he is able to leave the Unna boots in place for 1 week without change, so does not need home health at this time.    8/22/23 - Mr. Peralta is following up today for his bilateral lower extremities.  Both legs were assessed today and there are no open areas on either legs.  He will return in 1 month for followup but in the meantime he was given tubigrips to wear on a daily basis.  He was instructed to call should any areas open prior to the 1 month visit.    10/2/23 - the patient returns today with scattered venous ulcers on his bilateral lower extremities.  He reports that he was admitted to [facility] from 9/11/23 - 9/14/23 with SOB. He was discharged with Levaquin, which he has finished.  He had unna boots applied at his last wound clinic visit which was on 9/1/23.   Boots were still on when he was admitted on 9/14/23.  He reports that the hospital never removed those Unna boots and he still has the original Unna boots on today which were applied on 09/01/2023.  This has resulted in multiple additional venous ulcers and discomfort to the patient.  We will replace those Unna boots today applying silver alginate over the open areas.  He does have 2+ edema and reports that he has an appointment with Dr. Sevilla in a week or 2 but that he is taking his Lasix.  He was additionally sent home on home oxygen which he states he only needs every couple of days.    December 21, 2023:  73-year-old white male, with chronic bilateral venous insufficiency, was here several months ago for venous stasis.  The home health services have been seeing him twice a week, for continuous Unna boot applications.  He admits to very good results, with regard to the edema.  He has some superficial ulcerations are abrasions on the left lower leg.  There is no significant edema in either leg.    1/4/23 - Mr. Peralta returns today for followup on the bilateral lower extremity scattered venous ulcers.  At his last visit on 12/21/2023 the wounds to the right lower extremity were resolved and the left lower extremity has made good progress.  The Unna boot application were discontinued at that time and he be and using tubigrips only.  Today the left lower Extremity is largely resolved as well.  The patient was instructed to begin using a very light application Aquaphor lotion daily on both lower extremities and to continue to wear the tubigrips bilaterally.  He will return to clinic in 1 month for followup.    Review of Systems   Constitutional: Negative.    HENT: Negative.     Respiratory: Negative.     Cardiovascular: Negative.    Skin:         As documented in the HPI.   All other systems reviewed and are negative.        Objective:      Vitals:    01/04/24 1023   BP: (!) 126/59   BP Location: Left arm   Patient  "Position: Sitting   Pulse: (!) 55   Resp: 18   Weight: 107.5 kg (237 lb)   Height: 5' 9" (1.753 m)      Physical Exam  Vitals and nursing note reviewed.   Constitutional:       Appearance: Normal appearance.   Cardiovascular:      Rate and Rhythm: Normal rate.   Pulmonary:      Effort: Pulmonary effort is normal.   Skin:     General: Skin is warm and dry.      Comments: Bilateral venous ulcers   Neurological:      Mental Status: He is alert.          12/21/23      Right lower leg (HEALED)                           Left lower leg     01/04/24      Left lower leg   (HEALED)  ML  Assessment:           ICD-10-CM ICD-9-CM   1. Chronic venous insufficiency  I87.2 459.81   2. Venous stasis ulcer of right lower leg with edema of right lower leg  I83.019 454.8    I83.891 454.0    L97.919     R60.0    3. Venous stasis dermatitis of both lower extremities  I87.2 454.1   4. Stasis dermatitis of both legs  I87.2 454.1         Procedures:     Wound check only    Excisional debridement performed:  [] Yes [] No   Selective debridement performed:  [] Yes [] No   Mechanical debridement performed:  [] Yes [] No   Silver nitrate applied :   [] Yes [] No   Labs ordered this visit:   [] Yes [] No   Imaging ordered this visit:   [] Yes [] No   Tissue pathology and/or culture taken:  [] Yes [] No     Imaging:  Arterial:  FINDINGS:  Ultrasound Doppler and color flow imaging were performed on the arteries of the lower extremities bilaterally. There is scattered plaque formation at the arteries of lower extremities bilaterally with less than 30% stenosis at the common femoral arteries bilaterally.  A tri phasic flow wave pattern is evident throughout the visualized arteries of the lower extremities bilaterally.   Impression:   1. Scattered atherosclerotic plaque formation throughout the arteries of the lower extremities bilaterally with less than 30% stenosis at the common femoral arteries bilaterally.    Venous:  FINDINGS:  There is normal " compression and flow noted in the  common femoral vein, superficial femoral vein, popliteal vein, and  posterior tibial veinbilaterally.  No evidence of deep venous thrombosis is identified.  The left greater saphenous vein is surgically absent.  No venous reflux is identified.  Impression:   1.  No deep venous thrombosis identified to the lower extremities bilaterally   2.  Surgically absent left greater saphenous vein      Procedures:  HOME HEALTH AGENCY: [facility]   TIMES PER WEEK/DAYS:  ORDERS:  Right/Left lower legs: Apply size F tubigrip daily      Follow up in about 1 month (around 2/4/2024).

## 2024-02-06 ENCOUNTER — HOSPITAL ENCOUNTER (OUTPATIENT)
Dept: WOUND CARE | Facility: HOSPITAL | Age: 74
Discharge: HOME | End: 2024-02-06
Attending: NURSE PRACTITIONER
Payer: MEDICARE

## 2024-02-06 VITALS
HEIGHT: 69 IN | HEART RATE: 69 BPM | RESPIRATION RATE: 18 BRPM | WEIGHT: 237 LBS | DIASTOLIC BLOOD PRESSURE: 50 MMHG | SYSTOLIC BLOOD PRESSURE: 97 MMHG | BODY MASS INDEX: 35.1 KG/M2

## 2024-02-06 DIAGNOSIS — I87.2 CHRONIC VENOUS INSUFFICIENCY: Primary | ICD-10-CM

## 2024-02-06 DIAGNOSIS — I87.2 VENOUS STASIS DERMATITIS OF BOTH LOWER EXTREMITIES: ICD-10-CM

## 2024-02-06 DIAGNOSIS — Z51.89 VISIT FOR WOUND CHECK: ICD-10-CM

## 2024-02-06 PROCEDURE — 99212 OFFICE O/P EST SF 10 MIN: CPT

## 2024-02-06 PROCEDURE — 27000999 HC MEDICAL RECORD PHOTO DOCUMENTATION

## 2024-02-06 NOTE — PROGRESS NOTES
[facility]    Smoker   [] Yes   [x] No   Diabetic  [x] Yes  [] No   Last CBG: Did NOT check today, 02/06/24  Vascular Surgeon: NS (Dr. Sevilla -- Cardiologist)  Vascular procedures [] Yes [x] No    Recent Ultrasounds [x] Yes [] No   If so, when were they done? 06/26/2023  Compression Pumps  [] Yes [x] No    Doppler done in office? [] Yes [x] No     Is the patient eligible for a graft, and/or currently grafting? [] Yes [x] No    If so, which one/size?    Subjective:       Patient ID: Chepe Peralta Sr. is a 73 y.o. male.    Chief Complaint: Venous Ulcer (Left lower leg )    HPI    6/16/23 (Dr. Ann) - 72-year-old white male, who was referred from the emergency room several days ago, for stasis dermatitis and possible early cellulitis both lower legs.  He also has some edema around his ankles lower legs and feet.  He had this a few years ago also.  He has never been diagnosed with venous insufficiency as for a as he knows.  He also has not been diagnosed with arterial peripheral disease.  He does have a history of CHF, with atrial fibrillation.  He is scheduled to see his cardiologist soon.  He tells me that the erythema has improved since he was in the emergency room last week.  He also has some small blisters posteriorly on both calves.  There was no venous reflux noted in either leg on a venous ultrasound in October 2020.  Presented to [place] ED on 06/11/2023 with c/o leg swelling and redness with blisters appearing. Patient was given PO Augmentin and told to make an appointment with wound care. Is a patient of Dr. Sevilla with [facility] but denies ever having any ultrasounds of his legs and has never has any stents placed in legs. Has had a CABG done in the past; patient in unsure of exact date but states it has been around 10-12 years and is currently taking Xarelto. DMII with a CBG of 131 today. Has full sensation of his feet. Kristen Rosario is 10/10.     8/7/23 - Mr. Peralta returns to clinic today for  follow up on his bilateral lower extremities.  He was seen at wound care on 06/16/2023 by Dr. Ann and was given steroid cream and had ultrasounds ordered. Ultrasounds completed on 06/26/2023. Patient has been using hibiclens at home and the bilateral legs have improved.  There is still hemosiderin staining present, as well as lipodermatosclerosis.  Today the majority of the blistered areas are resolved.  We did review his ultrasound results and because there are no significant arterial concerns we will move forward with application of unna boots. There is very little drainage noted, and because of that we will leave the unna boots in place for one week without change.     8/14/23 - The patient returns today for his bilateral lower extremities.  He did have a small open area on the left lower extremity which has, for the most part, resolved.  There is still some very slight amount of drainage, however, the Unna boots do appear to be working.  We are measuring for compression leaves and will be having them go out to assess the patient for those pumps.  For now, we will continue to apply the Unna boots bilaterally.  Because there is very scant amount of drainage he is able to leave the Unna boots in place for 1 week without change, so does not need home health at this time.    8/22/23 - Mr. Peralta is following up today for his bilateral lower extremities.  Both legs were assessed today and there are no open areas on either legs.  He will return in 1 month for followup but in the meantime he was given tubigrips to wear on a daily basis.  He was instructed to call should any areas open prior to the 1 month visit.    10/2/23 - the patient returns today with scattered venous ulcers on his bilateral lower extremities.  He reports that he was admitted to [facility] from 9/11/23 - 9/14/23 with SOB. He was discharged with Levaquin, which he has finished.  He had unna boots applied at his last wound clinic visit which was on  9/1/23.  Boots were still on when he was admitted on 9/14/23.  He reports that the hospital never removed those Unna boots and he still has the original Unna boots on today which were applied on 09/01/2023.  This has resulted in multiple additional venous ulcers and discomfort to the patient.  We will replace those Unna boots today applying silver alginate over the open areas.  He does have 2+ edema and reports that he has an appointment with Dr. Sevilla in a week or 2 but that he is taking his Lasix.  He was additionally sent home on home oxygen which he states he only needs every couple of days.    December 21, 2023:  73-year-old white male, with chronic bilateral venous insufficiency, was here several months ago for venous stasis.  The home health services have been seeing him twice a week, for continuous Unna boot applications.  He admits to very good results, with regard to the edema.  He has some superficial ulcerations are abrasions on the left lower leg.  There is no significant edema in either leg.    1/4/23 - Mr. Peralta returns today for followup on the bilateral lower extremity scattered venous ulcers.  At his last visit on 12/21/2023 the wounds to the right lower extremity were resolved and the left lower extremity has made good progress.  The Unna boot application were discontinued at that time and he be and using tubigrips only.  Today the left lower Extremity is largely resolved as well.  The patient was instructed to begin using a very light application Aquaphor lotion daily on both lower extremities and to continue to wear the tubigrips bilaterally.  He will return to clinic in 1 month for followup.    2/6/24 - Mr. Peralta returns today for f/up wound check for the venous ulcers on his bilateral lower extremities.  He does continue to wear tubigrips daily and today does not have any open wounds.  He will continue to use Aquaphor lotion, lightly applied to his lower legs.  He will return to clinic in 6  "months unless he experiences any issues.     Review of Systems   Constitutional: Negative.    HENT: Negative.     Respiratory: Negative.     Cardiovascular: Negative.    Skin:         As documented in the HPI.   All other systems reviewed and are negative.        Objective:      Vitals:    02/06/24 0947   BP: (!) 97/50   BP Location: Right arm   Patient Position: Sitting   Pulse: 69   Resp: 18   Weight: 107.5 kg (237 lb)   Height: 5' 9" (1.753 m)      Physical Exam  Vitals and nursing note reviewed.   Constitutional:       Appearance: Normal appearance.   Cardiovascular:      Rate and Rhythm: Normal rate.   Pulmonary:      Effort: Pulmonary effort is normal.   Skin:     General: Skin is warm and dry.      Comments: Bilateral venous ulcers   Neurological:      Mental Status: He is alert.            02/06/24    Bilateral lower leg check   ML  Assessment:           ICD-10-CM ICD-9-CM   1. Chronic venous insufficiency  I87.2 459.81   2. Venous stasis dermatitis of both lower extremities  I87.2 454.1   3. Visit for wound check  Z51.89 V58.89           Procedures:     Wound check only    Excisional debridement performed:  [] Yes [] No   Selective debridement performed:  [] Yes [] No   Mechanical debridement performed:  [] Yes [] No   Silver nitrate applied :   [] Yes [] No   Labs ordered this visit:   [] Yes [] No   Imaging ordered this visit:   [] Yes [] No   Tissue pathology and/or culture taken:  [] Yes [] No     Imaging:  Arterial:  FINDINGS:  Ultrasound Doppler and color flow imaging were performed on the arteries of the lower extremities bilaterally. There is scattered plaque formation at the arteries of lower extremities bilaterally with less than 30% stenosis at the common femoral arteries bilaterally.  A tri phasic flow wave pattern is evident throughout the visualized arteries of the lower extremities bilaterally.   Impression:   1. Scattered atherosclerotic plaque formation throughout the arteries of the lower " extremities bilaterally with less than 30% stenosis at the common femoral arteries bilaterally.    Venous:  FINDINGS:  There is normal compression and flow noted in the  common femoral vein, superficial femoral vein, popliteal vein, and  posterior tibial veinbilaterally.  No evidence of deep venous thrombosis is identified.  The left greater saphenous vein is surgically absent.  No venous reflux is identified.  Impression:   1.  No deep venous thrombosis identified to the lower extremities bilaterally   2.  Surgically absent left greater saphenous vein      Procedures:  [facility]    ORDERS:  Right/Left lower legs: Apply aquaphor and size F tubigrip daily      Follow up in about 6 months (around 8/6/2024).

## 2024-07-04 ENCOUNTER — HOSPITAL ENCOUNTER (EMERGENCY)
Facility: HOSPITAL | Age: 74
Discharge: HOME | End: 2024-07-04
Attending: STUDENT IN AN ORGANIZED HEALTH CARE EDUCATION/TRAINING PROGRAM
Payer: MEDICARE

## 2024-07-04 VITALS
HEART RATE: 76 BPM | WEIGHT: 154 LBS | DIASTOLIC BLOOD PRESSURE: 70 MMHG | OXYGEN SATURATION: 98 % | HEIGHT: 69 IN | BODY MASS INDEX: 22.81 KG/M2 | TEMPERATURE: 98 F | RESPIRATION RATE: 13 BRPM | SYSTOLIC BLOOD PRESSURE: 148 MMHG

## 2024-07-04 DIAGNOSIS — S61.411A LACERATION OF RIGHT HAND WITHOUT FOREIGN BODY, INITIAL ENCOUNTER: ICD-10-CM

## 2024-07-04 DIAGNOSIS — S05.11XA PERIORBITAL CONTUSION OF RIGHT EYE, INITIAL ENCOUNTER: ICD-10-CM

## 2024-07-04 DIAGNOSIS — T07.XXXA ABRASIONS OF MULTIPLE SITES: ICD-10-CM
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
## 2024-07-04 LAB
ALBUMIN SERPL-MCNC: 3.6 G/DL (ref 3.4–4.8)
ALBUMIN/GLOB SERPL: 0.7 RATIO (ref 1.1–2)
ALP SERPL-CCNC: 87 UNIT/L (ref 40–150)
ALT SERPL-CCNC: 25 UNIT/L (ref 0–55)
ANION GAP SERPL CALC-SCNC: 11 MEQ/L
APTT PPP: 48.3 SECONDS (ref 24.6–37.2)
AST SERPL-CCNC: 26 UNIT/L (ref 5–34)
BASOPHILS # BLD AUTO: 0.02 X10(3)/MCL
BASOPHILS NFR BLD AUTO: 0.2 %
BILIRUB SERPL-MCNC: 0.7 MG/DL
BUN SERPL-MCNC: 44 MG/DL (ref 8.4–25.7)
CALCIUM SERPL-MCNC: 10.6 MG/DL (ref 8.8–10)
CHLORIDE SERPL-SCNC: 96 MMOL/L (ref 98–107)
CO2 SERPL-SCNC: 23 MMOL/L (ref 23–31)
CREAT SERPL-MCNC: 2.06 MG/DL (ref 0.73–1.18)
CREAT/UREA NIT SERPL: 21
EOSINOPHIL # BLD AUTO: 0.08 X10(3)/MCL (ref 0–0.9)
EOSINOPHIL NFR BLD AUTO: 0.8 %
ERYTHROCYTE [DISTWIDTH] IN BLOOD BY AUTOMATED COUNT: 14.8 % (ref 11.5–17)
GFR SERPLBLD CREATININE-BSD FMLA CKD-EPI: 33 ML/MIN/1.73/M2
GLOBULIN SER-MCNC: 5.4 GM/DL (ref 2.4–3.5)
GLUCOSE SERPL-MCNC: 379 MG/DL (ref 82–115)
HCT VFR BLD AUTO: 37 % (ref 42–52)
HGB BLD-MCNC: 11.9 G/DL (ref 14–18)
IMM GRANULOCYTES # BLD AUTO: 0.03 X10(3)/MCL (ref 0–0.04)
IMM GRANULOCYTES NFR BLD AUTO: 0.3 %
INR PPP: 1.4
LYMPHOCYTES # BLD AUTO: 1.74 X10(3)/MCL (ref 0.6–4.6)
LYMPHOCYTES NFR BLD AUTO: 17.7 %
MCH RBC QN AUTO: 28.3 PG (ref 27–31)
MCHC RBC AUTO-ENTMCNC: 32.2 G/DL (ref 33–36)
MCV RBC AUTO: 87.9 FL (ref 80–94)
MONOCYTES # BLD AUTO: 0.7 X10(3)/MCL (ref 0.1–1.3)
MONOCYTES NFR BLD AUTO: 7.1 %
NEUTROPHILS # BLD AUTO: 7.27 X10(3)/MCL (ref 2.1–9.2)
NEUTROPHILS NFR BLD AUTO: 73.9 %
PLATELET # BLD AUTO: 297 X10(3)/MCL (ref 130–400)
PMV BLD AUTO: 10 FL (ref 7.4–10.4)
POCT GLUCOSE: 230 MG/DL (ref 70–110)
POCT GLUCOSE: 325 MG/DL (ref 70–110)
POTASSIUM SERPL-SCNC: 5.1 MMOL/L (ref 3.5–5.1)
PROT SERPL-MCNC: 9 GM/DL (ref 5.8–7.6)
PROTHROMBIN TIME: 18 SECONDS (ref 12.5–14.5)
RBC # BLD AUTO: 4.21 X10(6)/MCL (ref 4.7–6.1)
SODIUM SERPL-SCNC: 130 MMOL/L (ref 136–145)
WBC # BLD AUTO: 9.84 X10(3)/MCL (ref 4.5–11.5)

## 2024-07-04 PROCEDURE — 63600175 PHARM REV CODE 636 W HCPCS: Performed by: STUDENT IN AN ORGANIZED HEALTH CARE EDUCATION/TRAINING PROGRAM

## 2024-07-04 PROCEDURE — 85730 THROMBOPLASTIN TIME PARTIAL: CPT | Performed by: STUDENT IN AN ORGANIZED HEALTH CARE EDUCATION/TRAINING PROGRAM

## 2024-07-04 PROCEDURE — 85610 PROTHROMBIN TIME: CPT | Performed by: STUDENT IN AN ORGANIZED HEALTH CARE EDUCATION/TRAINING PROGRAM

## 2024-07-04 PROCEDURE — 63600175 PHARM REV CODE 636 W HCPCS: Performed by: EMERGENCY MEDICINE

## 2024-07-04 PROCEDURE — 12002 RPR S/N/AX/GEN/TRNK2.6-7.5CM: CPT

## 2024-07-04 PROCEDURE — 90471 IMMUNIZATION ADMIN: CPT | Performed by: STUDENT IN AN ORGANIZED HEALTH CARE EDUCATION/TRAINING PROGRAM

## 2024-07-04 PROCEDURE — 99284 EMERGENCY DEPT VISIT MOD MDM: CPT | Mod: 25

## 2024-07-04 PROCEDURE — 82962 GLUCOSE BLOOD TEST: CPT

## 2024-07-04 PROCEDURE — 85025 COMPLETE CBC W/AUTO DIFF WBC: CPT | Performed by: STUDENT IN AN ORGANIZED HEALTH CARE EDUCATION/TRAINING PROGRAM

## 2024-07-04 PROCEDURE — 80053 COMPREHEN METABOLIC PANEL: CPT | Performed by: STUDENT IN AN ORGANIZED HEALTH CARE EDUCATION/TRAINING PROGRAM

## 2024-07-04 PROCEDURE — 90715 TDAP VACCINE 7 YRS/> IM: CPT | Performed by: STUDENT IN AN ORGANIZED HEALTH CARE EDUCATION/TRAINING PROGRAM

## 2024-07-04 PROCEDURE — 96374 THER/PROPH/DIAG INJ IV PUSH: CPT

## 2024-07-04 PROCEDURE — 96361 HYDRATE IV INFUSION ADD-ON: CPT

## 2024-07-04 PROCEDURE — 25000003 PHARM REV CODE 250: Performed by: STUDENT IN AN ORGANIZED HEALTH CARE EDUCATION/TRAINING PROGRAM

## 2024-07-04 RX ORDER — LIDOCAINE HYDROCHLORIDE 10 MG/ML
5 INJECTION, SOLUTION EPIDURAL; INFILTRATION; INTRACAUDAL; PERINEURAL
Status: COMPLETED | OUTPATIENT
Start: 2024-07-04 | End: 2024-07-04

## 2024-07-04 RX ORDER — TRAMADOL HYDROCHLORIDE 50 MG/1
50 TABLET ORAL EVERY 6 HOURS PRN
Qty: 20 TABLET | Refills: 0 | Status: SHIPPED | OUTPATIENT
Start: 2024-07-04 | End: 2024-07-09

## 2024-07-04 RX ADMIN — LIDOCAINE HYDROCHLORIDE 50 MG: 10 SOLUTION INTRAVENOUS at 05:07

## 2024-07-04 RX ADMIN — SODIUM CHLORIDE, POTASSIUM CHLORIDE, SODIUM LACTATE AND CALCIUM CHLORIDE 1000 ML: 600; 310; 30; 20 INJECTION, SOLUTION INTRAVENOUS at 05:07

## 2024-07-04 RX ADMIN — HUMAN INSULIN 10 UNITS: 100 INJECTION, SOLUTION SUBCUTANEOUS at 06:07

## 2024-07-04 RX ADMIN — SODIUM CHLORIDE, POTASSIUM CHLORIDE, SODIUM LACTATE AND CALCIUM CHLORIDE 1000 ML: 600; 310; 30; 20 INJECTION, SOLUTION INTRAVENOUS at 06:07

## 2024-07-04 RX ADMIN — TETANUS TOXOID, REDUCED DIPHTHERIA TOXOID AND ACELLULAR PERTUSSIS VACCINE, ADSORBED 0.5 ML: 5; 2.5; 8; 8; 2.5 SUSPENSION INTRAMUSCULAR at 06:07

## 2024-07-04 NOTE — ED PROVIDER NOTES
Encounter Date: 7/4/2024       History     Chief Complaint   Patient presents with    Fall    Laceration     Tripped while pushing a bike    fell onto it   lac to R brow and hand    denies LOC  on thinners     HPI    73-year-old male with a past medical history of paroxysmal AFib on Eliquis, hypertension, diabetes and CHF presents emergency department after a trip and fall.  Patient states he was pushing a bike when it got away from him caused him to fall.  States he hit his head with no loss of consciousness.  States he also cut his left 5th finger as well.  States that years back he almost lost his finger in a work accident and cut it in the same place.  States it has a bad cut.  States the bleeding stopped.    Review of patient's allergies indicates:  No Known Allergies  Past Medical History:   Diagnosis Date    CHF (congestive heart failure)     Chronic venous stasis dermatitis of both lower extremities     Depression     Diabetes mellitus     Gout, unspecified     Hypertension     Paroxysmal atrial fibrillation      Past Surgical History:   Procedure Laterality Date    CORONARY ARTERY BYPASS GRAFT       Family History   Problem Relation Name Age of Onset    Diabetes Mother      Heart disease Father       Social History     Tobacco Use    Smoking status: Never     Passive exposure: Never    Smokeless tobacco: Current     Types: Chew   Substance Use Topics    Alcohol use: Never    Drug use: Never     Review of Systems   Constitutional:  Negative for fever.   Respiratory:  Negative for cough.    Cardiovascular:  Negative for chest pain.   Gastrointestinal:  Negative for abdominal pain, constipation, diarrhea, nausea and vomiting.   Skin:  Positive for wound.   Neurological:  Negative for headaches.   All other systems reviewed and are negative.      Physical Exam     Initial Vitals   BP Pulse Resp Temp SpO2   07/04/24 1616 07/04/24 1616 07/04/24 1616 07/04/24 1616 07/04/24 1651   (!) 151/74 72 16 97.9 °F (36.6 °C)  96 %      MAP       --                Physical Exam    Nursing note and vitals reviewed.  Constitutional: He appears well-developed and well-nourished. No distress.   HENT:   Large ecchymoses to the right eyebrow   Cardiovascular:  Normal rate, regular rhythm and intact distal pulses.           Pulmonary/Chest: Breath sounds normal. No respiratory distress. He has no wheezes. He has no rhonchi.   Abdominal: Abdomen is soft. Bowel sounds are normal. There is no abdominal tenderness.   Musculoskeletal:         General: Normal range of motion.     Neurological: He is alert and oriented to person, place, and time. GCS score is 15. GCS eye subscore is 4. GCS verbal subscore is 5. GCS motor subscore is 6.   Skin: Skin is warm. Capillary refill takes less than 2 seconds.   Complex laceration to the palmar aspect of the right 5th digit proximally   Psychiatric: He has a normal mood and affect. Thought content normal.         ED Course   Lac Repair    Date/Time: 7/4/2024 5:52 PM    Performed by: Blue Paez MD  Authorized by: Blue Paez MD    Consent:     Consent obtained:  Verbal    Consent given by:  Patient    Risks, benefits, and alternatives were discussed: yes      Risks discussed:  Infection, need for additional repair, pain, retained foreign body, tendon damage, vascular damage and poor cosmetic result    Alternatives discussed:  No treatment, referral and delayed treatment  Anesthesia:     Anesthesia method:  Local infiltration    Local anesthetic:  Lidocaine 1% w/o epi  Laceration details:     Location:  Finger    Finger location:  R small finger    Length (cm):  3  Pre-procedure details:     Preparation:  Patient was prepped and draped in usual sterile fashion and imaging obtained to evaluate for foreign bodies  Exploration:     Limited defect created (wound extended): no      Hemostasis achieved with:  Direct pressure    Imaging obtained: x-ray      Imaging outcome: foreign body not noted       Wound exploration: wound explored through full range of motion and entire depth of wound visualized      Contaminated: no    Treatment:     Area cleansed with:  Povidone-iodine and soap and water  Skin repair:     Repair method:  Sutures    Suture size:  4-0 and 5-0    Suture material:  Nylon    Suture technique:  Simple interrupted    Number of sutures:  9  Approximation:     Approximation:  Close  Repair type:     Repair type:  Intermediate  Post-procedure details:     Dressing:  Non-adherent dressing    Procedure completion:  Tolerated well, no immediate complications    Labs Reviewed   APTT - Abnormal; Notable for the following components:       Result Value    PTT 48.3 (*)     All other components within normal limits   COMPREHENSIVE METABOLIC PANEL - Abnormal; Notable for the following components:    Sodium 130 (*)     Chloride 96 (*)     Glucose 379 (*)     Blood Urea Nitrogen 44.0 (*)     Creatinine 2.06 (*)     Calcium 10.6 (*)     Protein Total 9.0 (*)     Globulin 5.4 (*)     Albumin/Globulin Ratio 0.7 (*)     All other components within normal limits   PROTIME-INR - Abnormal; Notable for the following components:    PT 18.0 (*)     INR 1.4 (*)     All other components within normal limits   CBC WITH DIFFERENTIAL - Abnormal; Notable for the following components:    RBC 4.21 (*)     Hgb 11.9 (*)     Hct 37.0 (*)     MCHC 32.2 (*)     All other components within normal limits   POCT GLUCOSE - Abnormal; Notable for the following components:    POCT Glucose 325 (*)     All other components within normal limits   POCT GLUCOSE - Abnormal; Notable for the following components:    POCT Glucose 230 (*)     All other components within normal limits   CBC W/ AUTO DIFFERENTIAL    Narrative:     The following orders were created for panel order CBC auto differential.  Procedure                               Abnormality         Status                     ---------                               -----------          ------                     CBC with Differential[[id number]]       Abnormal            Final result                 Please view results for these tests on the individual orders.   URINALYSIS, REFLEX TO URINE CULTURE   POCT GLUCOSE MONITORING CONTINUOUS          Imaging Results              CT Cervical Spine Without Contrast (Preliminary result)  Result time 07/04/24 18:06:50      Preliminary result by Archie Esquivel Jr., MD (07/04/24 18:06:50)                   Narrative:    START OF REPORT:  Technique: CT of the cervical spine was performed without intravenous contrast with axial as well as sagittal and coronal images.    Comparison: None.    Dosage Information: Automated exposure control was utilized.    Clinical history: None.    Findings:  Lung apices: Nonspecific scarring but otherwise unremarkable.  Spine:  Spinal canal: The spinal canal appears unremarkable.  Mineralization: Within normal limits for age.  Rotation: No significant rotation is seen.  Scoliosis: No significant scoliosis is seen.  Vertebral Fusion: No vertebral fusion is identified.  Listhesis: No significant listhesis is identified.  Lordosis: The cervical lordosis is maintained.  Intervertebral disc spaces: Multilevel loss of disc height is seen.  Osteophytes: Mild multilevel endplate osteophytes are seen.  Endplate Sclerosis: No significant endplate sclerosis is seen.  Uncovertebral degenerative changes: Moderate multilevel uncovertebral joint arthrosis is seen.  Facet degenerative changes: Moderate multilevel facet degenerative changes are seen.  Fractures: No acute cervical spine fracture dislocation or subluxation is seen.  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  Orthopedic Hardware: None.    Miscellaneous:  Mastoid air cells: The visualized mastoid air cells appear clear.  Soft Tissues: Unremarkable.      Impression:  1. No acute cervical spine fracture dislocation or subluxation is seen.  2.  Degenerative changes and other details as above.                                         CT Maxillofacial Without Contrast (Preliminary result)  Result time 07/04/24 17:49:19      Preliminary result by Archie Esquivel Jr., MD (07/04/24 17:49:19)                   Narrative:    START OF REPORT:  Technique: Noncontrast maxillofacial CT was performed with axial as well as sagittal and coronal images being submitted for interpretation.    Comparison: None.    Clinical history: Fall.    Findings:  Facial soft tissues: There is a large soft tissue contusion hematoma in the right supraorbital region.  Bones:  Orbital bony structures: The bilateral orbital bony structures are intact with no orbital fracture identified.  Mandible: The mandible appears unremarkable with no fracture identified.  Maxilla: The maxilla appears unremarkable with no fracture identified.  Pterygoid plates: No fracture identified of the right or left pterygoid plates.  Zygoma: The zygomatic arches are intact with no zygomatic complex fracture identified.  TMJ: The mandibular condyles appear normally placed with respect to the mandibular fossa.  Nasal Bones: No displaced nasal bone fracture is seen. Mild rightward deviation of the nasal septum is seen.  Skull: No acute linear or depressed fracture is identified in the visualized skull.  Paranasal sinuses: The visualized paranasal sinuses appear clear with no mucoperiosteal thickening or air fluid levels identified.  Mastoid air cells: The visualized mastoid air cells appear clear.  Brain: Intracranial findings discussed separately.      Impression:  1. There is a large soft tissue contusion hematoma in the right supraorbital region.  2. No acute maxillofacial fracture identified. Details and other findings as noted above.                                         CT Head Without Contrast (Preliminary result)  Result time 07/04/24 17:49:00      Preliminary result by Archie Esquivel Jr., MD  (07/04/24 17:49:00)                   Narrative:    START OF REPORT:  Technique: CT of the head was performed without intravenous contrast with direct axial as well as coronal and sagittal reconstruction images.    Comparison: None.    Clinical history: Fall.    FINDINGS:  Hemorrhage: No acute intracranial hemorrhage is seen.  CSF spaces: The ventricles, sulci and basal cisterns all appear mildly prominent consistent with global cerebral atrophy.  Brain parenchyma: There is preservation of the grey white junction throughout. No intracranial contusion. Mild scattered microvascular change is seen in portions of the periventricular and deep white matter tracts.  Cerebellum: Unremarkable.  Vascular: Unremarkable venous sinuses. Mild atheromatous calcification of the intracranial arteries is seen.  Intracranial calcifications: Incidental note is made of bilateral choroid plexus calcification. Incidental note is made of some pineal region calcification. Incidental note is made of some calcification of the falx.  Calvarium: No acute linear or depressed skull fracture is seen.  Scalp: There is a large soft tissue contusion hematoma in the right supraorbital region. No underlying fracture seen.    Maxillofacial Structures:  Paranasal sinuses: The visualized paranasal sinuses appear clear with no mucoperiosteal thickening or air fluid levels identified.  Orbits: Unremarkable.  Zygomatic arches: Unremarkable.  Temporal bones and mastoids: Unremarkable.  TMJ: The mandibular condyles appear normally placed with respect to the mandibular fossa.  Nasal Bones: No displaced nasal bone fracture is seen.      Impression:  1. There is a large soft tissue contusion hematoma in the right supraorbital region. No underlying fracture seen.  2. No acute intracranial process identified. Details and other findings as noted above.                                         X-Ray Finger 2 or More Views Right (Final result)  Result time 07/04/24  16:50:17      Final result by Patrick Main MD (07/04/24 16:50:17)                   Impression:      No acute osseous finding.  No radiopaque foreign body    Soft tissue injury of the lateral 5th digit.      Electronically signed by: Patrick Main MD  Date:    07/04/2024  Time:    16:50               Narrative:    EXAMINATION:  Right 5th digit three views    CLINICAL HISTORY:  Fifth finger laceration    COMPARISON:  None    FINDINGS:  There is no acute fracture subluxation.  There is soft tissue laceration of the lateral aspect of the 5th digit.  No radiopaque foreign body.                        Wet Read by Blue Paez MD (07/04/24 16:47:09, [facility] - Emergency Dept, Emergency Medicine)    No fractures appreciated                                     Medications   LIDOcaine (PF) 10 mg/ml (1%) injection 50 mg (50 mg Infiltration Given by Other 7/4/24 1725)   lactated ringers bolus 1,000 mL ( Intravenous Stopped 7/4/24 1808)   Tdap (BOOSTRIX) vaccine injection 0.5 mL (0.5 mLs Intramuscular Given 7/4/24 1801)   lactated ringers bolus 1,000 mL (1,000 mLs Intravenous New Bag 7/4/24 1831)   insulin regular injection 10 Units 0.1 mL (10 Units Intravenous Given 7/4/24 1822)     Medical Decision Making  Initial Assessment:   Fall      Differential Diagnosis:   Judging by the patient's chief complaint and pertinent history, the patient has the following possible differential diagnoses, including but not limited to the following.  Some of these are deemed to be lower likelihood and some more likely based on my physical exam and history combined with possible lab work and/or imaging studies.   Please see the pertinent studies, and refer to the HPI.  Some of these diagnoses will take further evaluation to fully rule out, perhaps as an outpatient and the patient was encouraged to follow up when discharged for more comprehensive evaluation.  Fall, laceration, head injury, contusion, abrasion, fracture,   as well as multiple other possible etiologies      Amount and/or Complexity of Data Reviewed  Labs: ordered. Decision-making details documented in ED Course.  Radiology: ordered and independent interpretation performed.    Risk  OTC drugs.  Prescription drug management.               ED Course as of 07/04/24 1940   Thu Jul 04, 2024   1641 WBC: 9.84 [BS]   1641 Hemoglobin(!): 11.9 [BS]   1641 Hematocrit(!): 37.0 [BS]   1641 Platelet Count: 297 [BS]   1652 Sodium(!): 130 [BS]   1652 Potassium: 5.1 [BS]   1652 Chloride(!): 96 [BS]   1652 CO2: 23 [BS]   1652 Glucose(!): 379 [BS]   1652 BUN(!): 44.0 [BS]   1652 Creatinine(!): 2.06 [BS]   1652 Calcium(!): 10.6 [BS]   1753 Transition of care at 6 PM. Dr. Mccall will assume care and determine appropriate treatment, care of this patient as well as disposition.   [BS]   1935 Feeling better.  CBG improved with IVF and insulin.  Stable for D/C [CL]      ED Course User Index  [BS] Blue Paez MD  [CL] Klever Mccall MD                           Clinical Impression:  Final diagnoses:  [W19.XXXA] Fall, initial encounter (Primary)  [S05.11XA] Periorbital contusion of right eye, initial encounter  [T07.XXXA] Abrasions of multiple sites  [S61.411A] Laceration of right hand without foreign body, initial encounter          ED Disposition Condition    Discharge Stable          ED Prescriptions       Medication Sig Dispense Start Date End Date Auth. Provider    traMADoL (ULTRAM) 50 mg tablet Take 1 tablet (50 mg total) by mouth every 6 (six) hours as needed. Final diagnoses: [W19.XXXA] Fall, initial encounter (Primary) [S05.11XA] Periorbital contusion of right eye, initial encounter [T07.XXXA] Abrasions of multiple sites [S61.411A] Laceration of right hand without foreign body, initial encounter 20 tablet 7/4/2024 7/9/2024 Klever Mccall MD          Follow-up Information       Follow up With Specialties Details Why Contact Info    Payton Lawson FNP-C Family  Medicine Schedule an appointment as soon as possible for a visit   [address] [place] [address]  [phone]      Sutures out in 10 days                 Klever Mccall MD  07/04/24 1940

## 2025-02-18 ENCOUNTER — LAB VISIT (OUTPATIENT)
Dept: LAB | Facility: HOSPITAL | Age: 75
End: 2025-02-18
Attending: INTERNAL MEDICINE
Payer: MEDICARE

## 2025-02-18 DIAGNOSIS — I42.8 OBSCURE CARDIOMYOPATHY OF AFRICA: ICD-10-CM

## 2025-02-18 DIAGNOSIS — I25.10 CORONARY ATHEROSCLEROSIS OF NATIVE CORONARY ARTERY: Primary | ICD-10-CM

## 2025-02-18 LAB
ANION GAP SERPL CALC-SCNC: 7 MEQ/L
BUN SERPL-MCNC: 29 MG/DL (ref 8.4–25.7)
CALCIUM SERPL-MCNC: 9.4 MG/DL (ref 8.8–10)
CHLORIDE SERPL-SCNC: 108 MMOL/L (ref 98–107)
CO2 SERPL-SCNC: 25 MMOL/L (ref 23–31)
CREAT SERPL-MCNC: 1.48 MG/DL (ref 0.72–1.25)
CREAT/UREA NIT SERPL: 20
GFR SERPLBLD CREATININE-BSD FMLA CKD-EPI: 49 ML/MIN/1.73/M2
GLUCOSE SERPL-MCNC: 193 MG/DL (ref 82–115)
POTASSIUM SERPL-SCNC: 4.2 MMOL/L (ref 3.5–5.1)
SODIUM SERPL-SCNC: 140 MMOL/L (ref 136–145)

## 2025-02-18 PROCEDURE — 80048 BASIC METABOLIC PNL TOTAL CA: CPT

## 2025-02-18 PROCEDURE — 36415 COLL VENOUS BLD VENIPUNCTURE: CPT

## 2025-06-06 NOTE — PLAN OF CARE
Spoke to nurse Roland and informed him pt can d/c home and that once he is home, Yajaira said they will go and set up home O2 system.  Roland will speak to patient and call son to come and get him.    does not use hearing aids/L/R